# Patient Record
Sex: MALE | Race: WHITE | NOT HISPANIC OR LATINO | Employment: OTHER | URBAN - METROPOLITAN AREA
[De-identification: names, ages, dates, MRNs, and addresses within clinical notes are randomized per-mention and may not be internally consistent; named-entity substitution may affect disease eponyms.]

---

## 2024-04-03 ENCOUNTER — OFFICE VISIT (OUTPATIENT)
Dept: PAIN MEDICINE | Facility: CLINIC | Age: 74
End: 2024-04-03
Payer: COMMERCIAL

## 2024-04-03 VITALS
TEMPERATURE: 97.7 F | HEART RATE: 75 BPM | OXYGEN SATURATION: 96 % | WEIGHT: 155 LBS | HEIGHT: 69 IN | SYSTOLIC BLOOD PRESSURE: 108 MMHG | BODY MASS INDEX: 22.96 KG/M2 | RESPIRATION RATE: 18 BRPM | DIASTOLIC BLOOD PRESSURE: 77 MMHG

## 2024-04-03 DIAGNOSIS — R26.89 BALANCE PROBLEM: ICD-10-CM

## 2024-04-03 DIAGNOSIS — R29.2 HYPERREFLEXIA: Primary | ICD-10-CM

## 2024-04-03 DIAGNOSIS — M54.2 NECK PAIN: ICD-10-CM

## 2024-04-03 PROCEDURE — 1159F MED LIST DOCD IN RCRD: CPT | Performed by: ANESTHESIOLOGY

## 2024-04-03 PROCEDURE — 99204 OFFICE O/P NEW MOD 45 MIN: CPT | Performed by: ANESTHESIOLOGY

## 2024-04-03 PROCEDURE — 99214 OFFICE O/P EST MOD 30 MIN: CPT | Performed by: ANESTHESIOLOGY

## 2024-04-03 PROCEDURE — 1125F AMNT PAIN NOTED PAIN PRSNT: CPT | Performed by: ANESTHESIOLOGY

## 2024-04-03 PROCEDURE — 1160F RVW MEDS BY RX/DR IN RCRD: CPT | Performed by: ANESTHESIOLOGY

## 2024-04-03 RX ORDER — SUCRALFATE 1 G/1
1 TABLET ORAL
COMMUNITY

## 2024-04-03 RX ORDER — GABAPENTIN 600 MG/1
800 TABLET ORAL 3 TIMES DAILY
COMMUNITY
Start: 2023-08-28

## 2024-04-03 RX ORDER — PANTOPRAZOLE SODIUM 40 MG/1
40 TABLET, DELAYED RELEASE ORAL
COMMUNITY

## 2024-04-03 RX ORDER — OXYCODONE HYDROCHLORIDE 5 MG/1
5 TABLET ORAL EVERY 4 HOURS PRN
COMMUNITY

## 2024-04-03 RX ORDER — SENNOSIDES 8.6 MG
1 TABLET ORAL DAILY PRN
COMMUNITY
Start: 2024-03-01

## 2024-04-03 RX ORDER — ATORVASTATIN CALCIUM 80 MG/1
80 TABLET, FILM COATED ORAL NIGHTLY
COMMUNITY

## 2024-04-03 RX ORDER — DULOXETIN HYDROCHLORIDE 60 MG/1
120 CAPSULE, DELAYED RELEASE ORAL DAILY
COMMUNITY
Start: 2023-11-24

## 2024-04-03 RX ORDER — LOPERAMIDE HCL 2 MG
2 TABLET ORAL AS NEEDED
COMMUNITY

## 2024-04-03 RX ORDER — CYANOCOBALAMIN (VITAMIN B-12) 500 MCG
3 TABLET ORAL NIGHTLY PRN
COMMUNITY

## 2024-04-03 RX ORDER — BUPROPION HYDROCHLORIDE 150 MG/1
150 TABLET ORAL DAILY
COMMUNITY
Start: 2023-11-24

## 2024-04-03 RX ORDER — ACETAMINOPHEN 325 MG/1
650 TABLET ORAL EVERY 6 HOURS PRN
COMMUNITY

## 2024-04-03 RX ORDER — DOCUSATE SODIUM 100 MG/1
100 TABLET ORAL 2 TIMES DAILY PRN
COMMUNITY
Start: 2024-03-01

## 2024-04-03 RX ORDER — BISACODYL 5 MG
10 TABLET, DELAYED RELEASE (ENTERIC COATED) ORAL DAILY PRN
COMMUNITY

## 2024-04-03 RX ORDER — CYCLOBENZAPRINE HCL 5 MG
5 TABLET ORAL 3 TIMES DAILY PRN
COMMUNITY

## 2024-04-03 RX ORDER — METOPROLOL SUCCINATE 100 MG/1
100 TABLET, EXTENDED RELEASE ORAL DAILY
COMMUNITY
Start: 2023-11-24

## 2024-04-03 RX ORDER — DICLOFENAC SODIUM 10 MG/G
4 GEL TOPICAL 2 TIMES DAILY PRN
COMMUNITY

## 2024-04-03 RX ORDER — CHOLECALCIFEROL (VITAMIN D3) 50 MCG
50 TABLET ORAL DAILY
COMMUNITY
Start: 2024-02-24

## 2024-04-03 RX ORDER — LIDOCAINE 50 MG/G
1 PATCH TOPICAL DAILY
COMMUNITY

## 2024-04-03 RX ORDER — METHOCARBAMOL 750 MG/1
750 TABLET, FILM COATED ORAL EVERY 8 HOURS PRN
COMMUNITY

## 2024-04-03 RX ORDER — FLUTICASONE PROPIONATE 50 MCG
1 SPRAY, SUSPENSION (ML) NASAL DAILY
COMMUNITY
Start: 2023-11-24

## 2024-04-03 SDOH — ECONOMIC STABILITY: FOOD INSECURITY: WITHIN THE PAST 12 MONTHS, THE FOOD YOU BOUGHT JUST DIDN'T LAST AND YOU DIDN'T HAVE MONEY TO GET MORE.: NEVER TRUE

## 2024-04-03 SDOH — ECONOMIC STABILITY: FOOD INSECURITY: WITHIN THE PAST 12 MONTHS, YOU WORRIED THAT YOUR FOOD WOULD RUN OUT BEFORE YOU GOT MONEY TO BUY MORE.: NEVER TRUE

## 2024-04-03 ASSESSMENT — ENCOUNTER SYMPTOMS
SHORTNESS OF BREATH: 0
CHEST TIGHTNESS: 0
NUMBNESS: 1
WEAKNESS: 0
ADENOPATHY: 0
BACK PAIN: 1
OCCASIONAL FEELINGS OF UNSTEADINESS: 1
EYE PAIN: 0
LOSS OF SENSATION IN FEET: 1
ABDOMINAL PAIN: 0
NECK PAIN: 1
DEPRESSION: 1
FEVER: 0
DIFFICULTY URINATING: 0

## 2024-04-03 ASSESSMENT — COLUMBIA-SUICIDE SEVERITY RATING SCALE - C-SSRS
2. HAVE YOU ACTUALLY HAD ANY THOUGHTS OF KILLING YOURSELF?: NO
1. IN THE PAST MONTH, HAVE YOU WISHED YOU WERE DEAD OR WISHED YOU COULD GO TO SLEEP AND NOT WAKE UP?: NO
6. HAVE YOU EVER DONE ANYTHING, STARTED TO DO ANYTHING, OR PREPARED TO DO ANYTHING TO END YOUR LIFE?: NO

## 2024-04-03 ASSESSMENT — PAIN - FUNCTIONAL ASSESSMENT: PAIN_FUNCTIONAL_ASSESSMENT: 0-10

## 2024-04-03 ASSESSMENT — PATIENT HEALTH QUESTIONNAIRE - PHQ9
1. LITTLE INTEREST OR PLEASURE IN DOING THINGS: SEVERAL DAYS
2. FEELING DOWN, DEPRESSED OR HOPELESS: SEVERAL DAYS
SUM OF ALL RESPONSES TO PHQ9 QUESTIONS 1 AND 2: 2
10. IF YOU CHECKED OFF ANY PROBLEMS, HOW DIFFICULT HAVE THESE PROBLEMS MADE IT FOR YOU TO DO YOUR WORK, TAKE CARE OF THINGS AT HOME, OR GET ALONG WITH OTHER PEOPLE: VERY DIFFICULT

## 2024-04-03 ASSESSMENT — PAIN SCALES - GENERAL: PAINLEVEL: 8

## 2024-04-03 ASSESSMENT — PAIN DESCRIPTION - DESCRIPTORS: DESCRIPTORS: ACHING;BURNING

## 2024-04-03 ASSESSMENT — LIFESTYLE VARIABLES
HOW OFTEN DO YOU HAVE A DRINK CONTAINING ALCOHOL: NEVER
TOTAL SCORE: 0
AUDIT-C TOTAL SCORE: 0
SKIP TO QUESTIONS 9-10: 1
HOW OFTEN DO YOU HAVE SIX OR MORE DRINKS ON ONE OCCASION: NEVER
HOW MANY STANDARD DRINKS CONTAINING ALCOHOL DO YOU HAVE ON A TYPICAL DAY: PATIENT DOES NOT DRINK

## 2024-04-03 NOTE — PROGRESS NOTES
Chief Complain    New Patient Visit (For pain in  neck that radiates down to right shoulder, have been going on for several years. Deny neck and back surgery, had a block that lasted 1 day about 3- 4 years ago. Currently take oxycodone and use topical rubs with no relief. Was referred by NP. )    History Of Present Illness  Wilian Murry is a 73 y.o. male here for evaluation of neck pain, low back pain. The patient has been experiencing these symptoms for last 5year(s). The patient describes the pain as stabbing pain. The patient's current pain score is 6-7 on a scale from 0-10. The pain is worsened by  movement of his head  and is alleviated by medications opioid analgesics. Since the start of the symptoms the pain has been unchanged.    The patient denies any fever, chills, weight loss, bladder/ bowel incontinence, history of cancer, history of IV drug abuse.    Elbow down both arms are sleep X 1-2 years  Issues with balance  History of Marijuana use X 6 months 1-2 a week     Cyclobenzaprine  Tylenol  Cymbalta  Gabapentin  Methocarbamol  Oxycodone 5 mg every 4 hrs    Lives in [facility]      H/o perforated ulcer 4 months s/p Sx in [facility]?    Past Medical History  S/p pacemaker placement in November of 2004  Sick sinus syndrome  TIA  Anxiety      Surgical History  History of bowel surgery    Social History  He reports that he has been smoking cigarettes. He started smoking about 50 years ago. He has been smoking an average of .5 packs per day. He has never used smokeless tobacco. He reports that he does not drink alcohol and does not use drugs.    Family History  Mother- uterine cancer  Father - Brain Tumor     Allergies  Patient has no known allergies.    Review of Systems  Review of Systems   Constitutional:  Negative for fever.   HENT:  Negative for ear pain.    Eyes:  Negative for pain.   Respiratory:  Negative for chest tightness and shortness of breath.    Cardiovascular:  Positive for chest  "pain.   Gastrointestinal:  Negative for abdominal pain.   Endocrine: Negative for cold intolerance and heat intolerance.   Genitourinary:  Negative for difficulty urinating.   Musculoskeletal:  Positive for back pain and neck pain.   Skin:  Negative for rash.   Allergic/Immunologic: Negative for food allergies.   Neurological:  Positive for numbness. Negative for weakness.   Hematological:  Negative for adenopathy.   Psychiatric/Behavioral:  Negative for suicidal ideas.         Physical Exam  Physical Exam  HENT:      Head: Normocephalic and atraumatic.   Eyes:      Extraocular Movements: Extraocular movements intact.      Pupils: Pupils are equal, round, and reactive to light.   Cardiovascular:      Rate and Rhythm: Normal rate.   Pulmonary:      Effort: Pulmonary effort is normal.   Abdominal:      Palpations: Abdomen is soft.   Musculoskeletal:      Cervical back: Neck supple. Decreased range of motion.        Back:    Skin:     General: Skin is warm.   Neurological:      Mental Status: He is alert and oriented to person, place, and time.      Motor: Motor function is intact.      Coordination: Coordination abnormal.      Deep Tendon Reflexes:      Reflex Scores:       Tricep reflexes are 3+ on the right side and 3+ on the left side.       Bicep reflexes are 3+ on the right side and 3+ on the left side.       Brachioradialis reflexes are 3+ on the right side and 3+ on the left side.       Patellar reflexes are 3+ on the right side and 3+ on the left side.       Achilles reflexes are 3+ on the right side and 4+ on the left side.  Psychiatric:         Mood and Affect: Mood normal.         Behavior: Behavior normal.           Last Recorded Vitals  Blood pressure 108/77, pulse 75, temperature 36.5 °C (97.7 °F), resp. rate 18, height 1.753 m (5' 9\"), weight 70.3 kg (155 lb), SpO2 96 %.         Assessment/Plan   Encounter Diagnoses   Name Primary?    Hyperreflexia Yes    Balance problem     Neck pain         Wilian " Nolberto Murry is a 73 y.o. male here for evaluation of chronic neck, low back pain.  Worst of his pain is in the neck area.  He has been experiencing the symptoms for last 5 years or so has been gradually getting worse.  Report issues with his balance.  Currently he is residing in a nursing home for last 6 months..  He is on chronic opiate therapy for his chronic pain currently managing his pain with 5 mg of oxycodone which she is taking 3-4 times a day.  Which is helping his pain modestly.  Denies any side effects.  He does have past medical history significant of sick sinus syndrome, status post pacemaker placement history of intestinal perforation reportedly from NSAID use.  On physical examination today does have very brisk reflexes, I do suspect potential myelopathy.  I would recommend getting an x-ray and an CT scan for further evaluation.  Consider referral to neurosurgery after reviewing CT scan findings.  Would increase the oxycodone to 7.5 mg up to 4 times daily as needed.  Discussed risk associated with narcotics including but not limited to addiction, dependence, respiratory depression, death, mental health issues, hormonal changes.  I have personally reviewed the [facility] report. This report is scanned into the electronic medical record. I have considered the risks of abuse, dependence, addiction and diversion.  opiate agreement was signed by the patient.  Follow-up after completion of CT cervical spine.    Total time spent caring for the patient today was 45 minutes. This includes time spent before the visit reviewing the chart, time spent during the visit, and time spent after the visit on documentation.         Andriy Osei MD

## 2024-04-24 ENCOUNTER — NURSING HOME VISIT (OUTPATIENT)
Dept: POST ACUTE CARE | Facility: EXTERNAL LOCATION | Age: 74
End: 2024-04-24
Payer: COMMERCIAL

## 2024-04-24 DIAGNOSIS — M54.2 NECK PAIN: ICD-10-CM

## 2024-04-24 DIAGNOSIS — G45.9 TIA (TRANSIENT ISCHEMIC ATTACK): ICD-10-CM

## 2024-04-24 DIAGNOSIS — G54.2 CERVICAL NEUROPATHY: ICD-10-CM

## 2024-04-24 DIAGNOSIS — F41.9 ANXIETY: ICD-10-CM

## 2024-04-24 DIAGNOSIS — I10 PRIMARY HYPERTENSION: Primary | ICD-10-CM

## 2024-04-24 PROCEDURE — 99306 1ST NF CARE HIGH MDM 50: CPT | Performed by: STUDENT IN AN ORGANIZED HEALTH CARE EDUCATION/TRAINING PROGRAM

## 2024-04-25 PROBLEM — I10 PRIMARY HYPERTENSION: Status: ACTIVE | Noted: 2024-04-25

## 2024-04-25 PROBLEM — M54.2 NECK PAIN: Status: ACTIVE | Noted: 2024-04-25

## 2024-04-25 PROBLEM — F41.9 ANXIETY: Status: ACTIVE | Noted: 2024-04-25

## 2024-04-25 PROBLEM — G45.9 TIA (TRANSIENT ISCHEMIC ATTACK): Status: ACTIVE | Noted: 2024-04-25

## 2024-04-25 PROBLEM — G54.2 CERVICAL NEUROPATHY: Status: ACTIVE | Noted: 2024-04-25

## 2024-04-25 ASSESSMENT — ENCOUNTER SYMPTOMS
NECK PAIN: 1
GASTROINTESTINAL NEGATIVE: 1
CONSTITUTIONAL NEGATIVE: 1
BACK PAIN: 1
MYALGIAS: 1
NEUROLOGICAL NEGATIVE: 1
RESPIRATORY NEGATIVE: 1
PSYCHIATRIC NEGATIVE: 1
CARDIOVASCULAR NEGATIVE: 1
ARTHRALGIAS: 1

## 2024-04-26 NOTE — PROGRESS NOTES
Subjective   Patient ID: Wilian Murry is a 73 y.o. male.    Patient is a 73-year-old male who recently was at a previous facility.  Past medical history includes hypertension, depression, anxiety, TIA, hyperlipidemia, syncope who presents due to underlying cervical neuropathy.  Patient was admitted to previous facility for similar complaints of underlying cervical neuropathy.  Unfortunately unable to receive additional records.  On evaluation, patient reports that he has been having issues with his neck pain for quite some time.  States that it is unbearable.  He has not seen a neurosurgeon for this.  Does not have any numbness or tingling, however inhibits his daily life.  Otherwise, he reports that he is doing okay   But pain is unbearable.  States that he is working with therapy.  Denies any additional issues        Review of Systems   Constitutional: Negative.    HENT: Negative.     Respiratory: Negative.     Cardiovascular: Negative.    Gastrointestinal: Negative.    Musculoskeletal:  Positive for arthralgias, back pain, myalgias and neck pain.   Skin: Negative.    Neurological: Negative.         Dec ROM   Psychiatric/Behavioral: Negative.         Objective Vitals Reviewed via facility EMR   Physical Exam  Constitutional:       General: He is not in acute distress.     Appearance: He is not ill-appearing.   Eyes:      Pupils: Pupils are equal, round, and reactive to light.   Cardiovascular:      Rate and Rhythm: Normal rate and regular rhythm.      Pulses: Normal pulses.      Heart sounds: No murmur heard.  Pulmonary:      Effort: No respiratory distress.      Breath sounds: No wheezing.   Abdominal:      General: Abdomen is flat. Bowel sounds are normal. There is no distension.   Musculoskeletal:      Right lower leg: No edema.      Left lower leg: No edema.      Comments: Noted difficulty with ROM of cervial spine, pain reported with rotation   Skin:     General: Skin is warm and dry.   Neurological:       Mental Status: He is alert. Mental status is at baseline.      Cranial Nerves: No cranial nerve deficit.      Motor: No weakness.   Psychiatric:         Mood and Affect: Mood normal.         Behavior: Behavior normal.         Assessment/Plan   Diagnoses and all orders for this visit:  Primary hypertension  Anxiety  Neck pain  TIA (transient ischemic attack)  Cervical neuropathy    Patient seen and examined on routine evaluation.  Recently admitted to the facility.  Per history from previous documentation, patient can be drug-seeking at times.  Has follow-up with pain management, encouraged follow-up with regards to this.  Continue PT OT.  Advised weekly labs while on skilled therapy.  Will follow-up on titrating pain medication until seen by pain management.  Try to wean down narcotics as tolerated.  Likely needs underlying cervical MRI due to chronic pain however will defer to pain management specialist. Updated [facility] with care plan    Reviewed and approved by JOHN BECK on 4/25/24 at 11:10 PM.

## 2024-05-01 ENCOUNTER — NURSING HOME VISIT (OUTPATIENT)
Dept: POST ACUTE CARE | Facility: EXTERNAL LOCATION | Age: 74
End: 2024-05-01
Payer: COMMERCIAL

## 2024-05-01 DIAGNOSIS — Z76.5 DRUG-SEEKING BEHAVIOR: ICD-10-CM

## 2024-05-01 DIAGNOSIS — G45.9 TIA (TRANSIENT ISCHEMIC ATTACK): ICD-10-CM

## 2024-05-01 DIAGNOSIS — G54.2 CERVICAL NEUROPATHY: Primary | ICD-10-CM

## 2024-05-01 DIAGNOSIS — M54.2 NECK PAIN: ICD-10-CM

## 2024-05-01 DIAGNOSIS — I10 PRIMARY HYPERTENSION: ICD-10-CM

## 2024-05-01 PROCEDURE — 99309 SBSQ NF CARE MODERATE MDM 30: CPT | Performed by: STUDENT IN AN ORGANIZED HEALTH CARE EDUCATION/TRAINING PROGRAM

## 2024-05-01 NOTE — LETTER
Patient: Wilina Murry  : 1950    Encounter Date: 2024    Subjective  Patient ID: Wilian Murry is a 73 y.o. male.    Patient seen and examined at bedside.  Regards that he is overall doing well, however still having some cervical pain.  States that this is a chronic issue for him, but is frustrated with the management of this.  Recently saw pain management, did changes medications to 7.5 mg every 6 hours.  He regards that this did not make any improvement of his pain management, and would like to go back to his original dosing of 5 mg every 4 hours.  He did inquire about increasing 7.5 mg every 4 hours however discussed risks with regards to this as he is likely becoming addicted to the medication and would not recommend further titration of dosing.  Otherwise, states he is overall doing well with therapy.  Denies any additional issues.        Review of Systems   Constitutional: Negative.    HENT: Negative.     Respiratory: Negative.     Cardiovascular: Negative.    Gastrointestinal: Negative.    Musculoskeletal:  Positive for arthralgias, back pain, neck pain and neck stiffness.   Skin: Negative.    Neurological: Negative.    Psychiatric/Behavioral: Negative.         ObjectiveVitals Reviewed via facility EMR   Physical Exam  Constitutional:       General: He is not in acute distress.     Appearance: He is not ill-appearing.      Comments: Ambualting well with therapy   Eyes:      Pupils: Pupils are equal, round, and reactive to light.   Cardiovascular:      Rate and Rhythm: Normal rate and regular rhythm.      Pulses: Normal pulses.      Heart sounds: No murmur heard.  Pulmonary:      Effort: No respiratory distress.      Breath sounds: No wheezing.   Abdominal:      General: Abdomen is flat. Bowel sounds are normal. There is no distension.   Musculoskeletal:      Right lower leg: No edema.      Left lower leg: No edema.   Skin:     General: Skin is warm and dry.   Neurological:      Mental  Status: He is alert. Mental status is at baseline.      Cranial Nerves: No cranial nerve deficit.      Motor: Weakness present.   Psychiatric:         Mood and Affect: Mood normal.         Behavior: Behavior normal.         Assessment/Plan  Diagnoses and all orders for this visit:  Cervical neuropathy  TIA (transient ischemic attack)  Neck pain  Primary hypertension  Drug-seeking behavior        As per HPI, patient seen and evaluated.  Did inquire about increasing opioid medication dosing, however highly deferred this.  Recommend close follow-up with pain management.  We will change patient to 5 mg every 4 hours, in addition had Flexeril as needed as well as lidocaine patch.  Discussed with patient that we will continue to try to titrate down his pain medication.  Advised continuation with physical therapy.  Due to significant degeneration, as well as patient following with pain management.  Advised neurosurgery evaluation.  Continue supportive care.  Discussed with staff who is agreeable with plan.    Reviewed and approved by JASE BECK on 5/4/24 at 8:39 PM.       Electronically Signed By: Jase Beck DO   5/4/24  8:39 PM

## 2024-05-04 PROBLEM — Z76.5 DRUG-SEEKING BEHAVIOR: Status: ACTIVE | Noted: 2024-05-04

## 2024-05-04 ASSESSMENT — ENCOUNTER SYMPTOMS
BACK PAIN: 1
PSYCHIATRIC NEGATIVE: 1
NEUROLOGICAL NEGATIVE: 1
RESPIRATORY NEGATIVE: 1
NECK STIFFNESS: 1
NECK PAIN: 1
CARDIOVASCULAR NEGATIVE: 1
CONSTITUTIONAL NEGATIVE: 1
ARTHRALGIAS: 1
GASTROINTESTINAL NEGATIVE: 1

## 2024-05-05 NOTE — PROGRESS NOTES
Subjective   Patient ID: Wilian Murry is a 73 y.o. male.    Patient seen and examined at bedside.  Regards that he is overall doing well, however still having some cervical pain.  States that this is a chronic issue for him, but is frustrated with the management of this.  Recently saw pain management, did changes medications to 7.5 mg every 6 hours.  He regards that this did not make any improvement of his pain management, and would like to go back to his original dosing of 5 mg every 4 hours.  He did inquire about increasing 7.5 mg every 4 hours however discussed risks with regards to this as he is likely becoming addicted to the medication and would not recommend further titration of dosing.  Otherwise, states he is overall doing well with therapy.  Denies any additional issues.        Review of Systems   Constitutional: Negative.    HENT: Negative.     Respiratory: Negative.     Cardiovascular: Negative.    Gastrointestinal: Negative.    Musculoskeletal:  Positive for arthralgias, back pain, neck pain and neck stiffness.   Skin: Negative.    Neurological: Negative.    Psychiatric/Behavioral: Negative.         Objective Vitals Reviewed via facility EMR   Physical Exam  Constitutional:       General: He is not in acute distress.     Appearance: He is not ill-appearing.      Comments: Ambualting well with therapy   Eyes:      Pupils: Pupils are equal, round, and reactive to light.   Cardiovascular:      Rate and Rhythm: Normal rate and regular rhythm.      Pulses: Normal pulses.      Heart sounds: No murmur heard.  Pulmonary:      Effort: No respiratory distress.      Breath sounds: No wheezing.   Abdominal:      General: Abdomen is flat. Bowel sounds are normal. There is no distension.   Musculoskeletal:      Right lower leg: No edema.      Left lower leg: No edema.   Skin:     General: Skin is warm and dry.   Neurological:      Mental Status: He is alert. Mental status is at baseline.      Cranial Nerves:  No cranial nerve deficit.      Motor: Weakness present.   Psychiatric:         Mood and Affect: Mood normal.         Behavior: Behavior normal.         Assessment/Plan   Diagnoses and all orders for this visit:  Cervical neuropathy  TIA (transient ischemic attack)  Neck pain  Primary hypertension  Drug-seeking behavior        As per HPI, patient seen and evaluated.  Did inquire about increasing opioid medication dosing, however highly deferred this.  Recommend close follow-up with pain management.  We will change patient to 5 mg every 4 hours, in addition had Flexeril as needed as well as lidocaine patch.  Discussed with patient that we will continue to try to titrate down his pain medication.  Advised continuation with physical therapy.  Due to significant degeneration, as well as patient following with pain management.  Advised neurosurgery evaluation.  Continue supportive care.  Discussed with staff who is agreeable with plan.    Reviewed and approved by JOHN BECK on 5/4/24 at 8:39 PM.

## 2024-05-09 ENCOUNTER — NURSING HOME VISIT (OUTPATIENT)
Dept: POST ACUTE CARE | Facility: EXTERNAL LOCATION | Age: 74
End: 2024-05-09
Payer: COMMERCIAL

## 2024-05-09 DIAGNOSIS — Z76.5 DRUG-SEEKING BEHAVIOR: ICD-10-CM

## 2024-05-09 DIAGNOSIS — G54.2 CERVICAL NEUROPATHY: Primary | ICD-10-CM

## 2024-05-09 DIAGNOSIS — F41.9 ANXIETY: ICD-10-CM

## 2024-05-09 PROCEDURE — 99308 SBSQ NF CARE LOW MDM 20: CPT | Performed by: STUDENT IN AN ORGANIZED HEALTH CARE EDUCATION/TRAINING PROGRAM

## 2024-05-09 ASSESSMENT — ENCOUNTER SYMPTOMS
PSYCHIATRIC NEGATIVE: 1
CARDIOVASCULAR NEGATIVE: 1
RESPIRATORY NEGATIVE: 1
CONSTITUTIONAL NEGATIVE: 1
ARTHRALGIAS: 1
GASTROINTESTINAL NEGATIVE: 1
NEUROLOGICAL NEGATIVE: 1
BACK PAIN: 1

## 2024-05-10 NOTE — PROGRESS NOTES
Subjective   Patient ID: Wilian Murry is a 73 y.o. male.    Patient seen and evaluated at bedside.  Regards that he is doing overall well, intermittently, does have issues with his underlying neck.  States that he does have a pain management appointment upcoming next week.  Regards that his current pain regimen is not completely controlling his pain, however staff has been noticing that patient does seem manipulative with regards to narcotic medications.  He does endorse some numbness and tingling though.  Otherwise, regards no additional issues or concerns.          Review of Systems   Constitutional: Negative.    HENT: Negative.     Respiratory: Negative.     Cardiovascular: Negative.    Gastrointestinal: Negative.    Musculoskeletal:  Positive for arthralgias and back pain.   Skin: Negative.    Neurological: Negative.    Psychiatric/Behavioral: Negative.         Objective Vitals Reviewed via facility EMR   Physical Exam  Constitutional:       General: He is not in acute distress.     Appearance: He is not ill-appearing.   Eyes:      Pupils: Pupils are equal, round, and reactive to light.   Cardiovascular:      Rate and Rhythm: Normal rate and regular rhythm.      Pulses: Normal pulses.      Heart sounds: No murmur heard.  Pulmonary:      Effort: No respiratory distress.      Breath sounds: No wheezing.   Abdominal:      General: Abdomen is flat. Bowel sounds are normal. There is no distension.   Musculoskeletal:      Right lower leg: No edema.      Left lower leg: No edema.      Comments: ROM present, pain out of proportion of exam   Skin:     General: Skin is warm and dry.   Neurological:      Mental Status: He is alert. Mental status is at baseline.      Cranial Nerves: No cranial nerve deficit.      Motor: No weakness.   Psychiatric:         Mood and Affect: Mood normal.         Behavior: Behavior normal.      Comments: Drug seeking, fixated         Assessment/Plan   Diagnoses and all orders for this  visit:  Cervical neuropathy  Drug-seeking behavior  Anxiety    Patient seen and examined.  I appreciate pain  medicine recommendations.  Will need multimodal approach with regards to patient's pain control as suspect they will be difficult to wean off narcotics.  Recommend Medrol Dosepak.  Potentially could benefit from cervical injection.  Continue supportive care, PT OT.  Will hold off on changing pain medications until seen by appropriate specialist, as abrupt change could cause worsening side effects or poor outcomes.  Continue supportive care, staff updated with care plan.    Reviewed and approved by JOHN BECK on 5/9/24 at 11:19 PM.

## 2024-05-20 ENCOUNTER — HOSPITAL ENCOUNTER (EMERGENCY)
Facility: HOSPITAL | Age: 74
Discharge: HOME | End: 2024-05-20
Attending: EMERGENCY MEDICINE
Payer: COMMERCIAL

## 2024-05-20 ENCOUNTER — APPOINTMENT (OUTPATIENT)
Dept: CARDIOLOGY | Facility: HOSPITAL | Age: 74
End: 2024-05-20
Payer: COMMERCIAL

## 2024-05-20 VITALS
SYSTOLIC BLOOD PRESSURE: 97 MMHG | HEART RATE: 62 BPM | OXYGEN SATURATION: 96 % | DIASTOLIC BLOOD PRESSURE: 70 MMHG | RESPIRATION RATE: 16 BRPM | TEMPERATURE: 98 F | BODY MASS INDEX: 25.12 KG/M2 | WEIGHT: 160.05 LBS | HEIGHT: 67 IN

## 2024-05-20 DIAGNOSIS — T14.91XA SUICIDAL BEHAVIOR WITH ATTEMPTED SELF-INJURY (MULTI): Primary | ICD-10-CM

## 2024-05-20 LAB
ALBUMIN SERPL BCP-MCNC: 3.8 G/DL (ref 3.4–5)
ALP SERPL-CCNC: 67 U/L (ref 33–136)
ALT SERPL W P-5'-P-CCNC: 19 U/L (ref 10–52)
AMPHETAMINES UR QL SCN: ABNORMAL
ANION GAP SERPL CALC-SCNC: 10 MMOL/L (ref 10–20)
APAP SERPL-MCNC: <10 UG/ML
APPEARANCE UR: CLEAR
AST SERPL W P-5'-P-CCNC: 15 U/L (ref 9–39)
BARBITURATES UR QL SCN: ABNORMAL
BASOPHILS # BLD AUTO: 0.04 X10*3/UL (ref 0–0.1)
BASOPHILS NFR BLD AUTO: 0.5 %
BENZODIAZ UR QL SCN: ABNORMAL
BILIRUB SERPL-MCNC: 0.4 MG/DL (ref 0–1.2)
BILIRUB UR STRIP.AUTO-MCNC: NEGATIVE MG/DL
BUN SERPL-MCNC: 20 MG/DL (ref 6–23)
BZE UR QL SCN: ABNORMAL
CALCIUM SERPL-MCNC: 9 MG/DL (ref 8.6–10.3)
CANNABINOIDS UR QL SCN: ABNORMAL
CHLORIDE SERPL-SCNC: 108 MMOL/L (ref 98–107)
CO2 SERPL-SCNC: 23 MMOL/L (ref 21–32)
COLOR UR: NORMAL
CREAT SERPL-MCNC: 0.95 MG/DL (ref 0.5–1.3)
EGFRCR SERPLBLD CKD-EPI 2021: 86 ML/MIN/1.73M*2
EOSINOPHIL # BLD AUTO: 0.61 X10*3/UL (ref 0–0.4)
EOSINOPHIL NFR BLD AUTO: 7 %
ERYTHROCYTE [DISTWIDTH] IN BLOOD BY AUTOMATED COUNT: 17.2 % (ref 11.5–14.5)
ETHANOL SERPL-MCNC: <10 MG/DL
FENTANYL+NORFENTANYL UR QL SCN: ABNORMAL
GLUCOSE SERPL-MCNC: 73 MG/DL (ref 74–99)
GLUCOSE UR STRIP.AUTO-MCNC: NORMAL MG/DL
HCT VFR BLD AUTO: 41.2 % (ref 41–52)
HGB BLD-MCNC: 12.7 G/DL (ref 13.5–17.5)
HOLD SPECIMEN: NORMAL
HOLD SPECIMEN: NORMAL
IMM GRANULOCYTES # BLD AUTO: 0.03 X10*3/UL (ref 0–0.5)
IMM GRANULOCYTES NFR BLD AUTO: 0.3 % (ref 0–0.9)
KETONES UR STRIP.AUTO-MCNC: NEGATIVE MG/DL
LEUKOCYTE ESTERASE UR QL STRIP.AUTO: NEGATIVE
LYMPHOCYTES # BLD AUTO: 2.28 X10*3/UL (ref 0.8–3)
LYMPHOCYTES NFR BLD AUTO: 26.3 %
MCH RBC QN AUTO: 28.7 PG (ref 26–34)
MCHC RBC AUTO-ENTMCNC: 30.8 G/DL (ref 32–36)
MCV RBC AUTO: 93 FL (ref 80–100)
METHADONE UR QL SCN: ABNORMAL
MONOCYTES # BLD AUTO: 1.36 X10*3/UL (ref 0.05–0.8)
MONOCYTES NFR BLD AUTO: 15.7 %
NEUTROPHILS # BLD AUTO: 4.36 X10*3/UL (ref 1.6–5.5)
NEUTROPHILS NFR BLD AUTO: 50.2 %
NITRITE UR QL STRIP.AUTO: NEGATIVE
NRBC BLD-RTO: 0 /100 WBCS (ref 0–0)
OPIATES UR QL SCN: ABNORMAL
OXYCODONE+OXYMORPHONE UR QL SCN: ABNORMAL
PCP UR QL SCN: ABNORMAL
PH UR STRIP.AUTO: 6 [PH]
PLATELET # BLD AUTO: 146 X10*3/UL (ref 150–450)
POTASSIUM SERPL-SCNC: 4.3 MMOL/L (ref 3.5–5.3)
PROT SERPL-MCNC: 6.1 G/DL (ref 6.4–8.2)
PROT UR STRIP.AUTO-MCNC: NEGATIVE MG/DL
RBC # BLD AUTO: 4.42 X10*6/UL (ref 4.5–5.9)
RBC # UR STRIP.AUTO: NEGATIVE /UL
SALICYLATES SERPL-MCNC: <3 MG/DL
SODIUM SERPL-SCNC: 137 MMOL/L (ref 136–145)
SP GR UR STRIP.AUTO: 1.01
UROBILINOGEN UR STRIP.AUTO-MCNC: NORMAL MG/DL
WBC # BLD AUTO: 8.7 X10*3/UL (ref 4.4–11.3)

## 2024-05-20 PROCEDURE — 96374 THER/PROPH/DIAG INJ IV PUSH: CPT

## 2024-05-20 PROCEDURE — 2500000004 HC RX 250 GENERAL PHARMACY W/ HCPCS (ALT 636 FOR OP/ED)

## 2024-05-20 PROCEDURE — 99205 OFFICE O/P NEW HI 60 MIN: CPT

## 2024-05-20 PROCEDURE — 2500000004 HC RX 250 GENERAL PHARMACY W/ HCPCS (ALT 636 FOR OP/ED): Performed by: STUDENT IN AN ORGANIZED HEALTH CARE EDUCATION/TRAINING PROGRAM

## 2024-05-20 PROCEDURE — 81003 URINALYSIS AUTO W/O SCOPE: CPT | Mod: 59 | Performed by: STUDENT IN AN ORGANIZED HEALTH CARE EDUCATION/TRAINING PROGRAM

## 2024-05-20 PROCEDURE — 99284 EMERGENCY DEPT VISIT MOD MDM: CPT | Mod: 25

## 2024-05-20 PROCEDURE — 93005 ELECTROCARDIOGRAM TRACING: CPT

## 2024-05-20 PROCEDURE — 84075 ASSAY ALKALINE PHOSPHATASE: CPT | Performed by: STUDENT IN AN ORGANIZED HEALTH CARE EDUCATION/TRAINING PROGRAM

## 2024-05-20 PROCEDURE — 93005 ELECTROCARDIOGRAM TRACING: CPT | Mod: 59

## 2024-05-20 PROCEDURE — 80143 DRUG ASSAY ACETAMINOPHEN: CPT | Performed by: STUDENT IN AN ORGANIZED HEALTH CARE EDUCATION/TRAINING PROGRAM

## 2024-05-20 PROCEDURE — 85025 COMPLETE CBC W/AUTO DIFF WBC: CPT | Performed by: STUDENT IN AN ORGANIZED HEALTH CARE EDUCATION/TRAINING PROGRAM

## 2024-05-20 PROCEDURE — 96375 TX/PRO/DX INJ NEW DRUG ADDON: CPT

## 2024-05-20 PROCEDURE — 80307 DRUG TEST PRSMV CHEM ANLYZR: CPT | Performed by: STUDENT IN AN ORGANIZED HEALTH CARE EDUCATION/TRAINING PROGRAM

## 2024-05-20 PROCEDURE — 99284 EMERGENCY DEPT VISIT MOD MDM: CPT | Performed by: EMERGENCY MEDICINE

## 2024-05-20 PROCEDURE — 2500000001 HC RX 250 WO HCPCS SELF ADMINISTERED DRUGS (ALT 637 FOR MEDICARE OP): Performed by: STUDENT IN AN ORGANIZED HEALTH CARE EDUCATION/TRAINING PROGRAM

## 2024-05-20 PROCEDURE — 36415 COLL VENOUS BLD VENIPUNCTURE: CPT | Performed by: STUDENT IN AN ORGANIZED HEALTH CARE EDUCATION/TRAINING PROGRAM

## 2024-05-20 RX ORDER — DIPHENHYDRAMINE HYDROCHLORIDE 50 MG/ML
25 INJECTION INTRAMUSCULAR; INTRAVENOUS ONCE
Status: COMPLETED | OUTPATIENT
Start: 2024-05-20 | End: 2024-05-20

## 2024-05-20 RX ORDER — MORPHINE SULFATE 4 MG/ML
4 INJECTION, SOLUTION INTRAMUSCULAR; INTRAVENOUS ONCE
Status: COMPLETED | OUTPATIENT
Start: 2024-05-20 | End: 2024-05-20

## 2024-05-20 RX ORDER — DIPHENHYDRAMINE HYDROCHLORIDE 50 MG/ML
INJECTION INTRAMUSCULAR; INTRAVENOUS
Status: COMPLETED
Start: 2024-05-20 | End: 2024-05-20

## 2024-05-20 RX ORDER — OXYCODONE HYDROCHLORIDE 5 MG/1
5 TABLET ORAL ONCE
Status: COMPLETED | OUTPATIENT
Start: 2024-05-20 | End: 2024-05-20

## 2024-05-20 RX ADMIN — OXYCODONE HYDROCHLORIDE 5 MG: 5 TABLET ORAL at 11:23

## 2024-05-20 RX ADMIN — DIPHENHYDRAMINE HYDROCHLORIDE 25 MG: 50 INJECTION, SOLUTION INTRAMUSCULAR; INTRAVENOUS at 05:48

## 2024-05-20 RX ADMIN — DIPHENHYDRAMINE HYDROCHLORIDE 25 MG: 50 INJECTION INTRAMUSCULAR; INTRAVENOUS at 05:48

## 2024-05-20 RX ADMIN — MORPHINE SULFATE 4 MG: 4 INJECTION, SOLUTION INTRAMUSCULAR; INTRAVENOUS at 05:30

## 2024-05-20 SDOH — SOCIAL STABILITY: SOCIAL NETWORK: EMOTIONAL SUPPORT GIVEN: REASSURE

## 2024-05-20 SDOH — HEALTH STABILITY: MENTAL HEALTH: HAVE YOU WISHED YOU WERE DEAD OR WISHED YOU COULD GO TO SLEEP AND NOT WAKE UP?: NO

## 2024-05-20 SDOH — HEALTH STABILITY: MENTAL HEALTH: CONTENT: UNREMARKABLE

## 2024-05-20 SDOH — HEALTH STABILITY: MENTAL HEALTH: HAVE YOU ACTUALLY HAD ANY THOUGHTS OF KILLING YOURSELF?: NO

## 2024-05-20 SDOH — HEALTH STABILITY: MENTAL HEALTH: SUICIDE ASSESSMENT: ADULT (C-SSRS)

## 2024-05-20 SDOH — HEALTH STABILITY: MENTAL HEALTH: BEHAVIORS/MOOD: ANXIOUS;CRYING

## 2024-05-20 SDOH — HEALTH STABILITY: MENTAL HEALTH: NEEDS EXPRESSED: EMOTIONAL

## 2024-05-20 SDOH — HEALTH STABILITY: MENTAL HEALTH: HAVE YOU EVER DONE ANYTHING, STARTED TO DO ANYTHING, OR PREPARED TO DO ANYTHING TO END YOUR LIFE?: NO

## 2024-05-20 SDOH — HEALTH STABILITY: MENTAL HEALTH: BEHAVIORS/MOOD: APPROPRIATE FOR SITUATION

## 2024-05-20 SDOH — HEALTH STABILITY: MENTAL HEALTH: SLEEP PATTERN: DIFFICULTY FALLING ASLEEP

## 2024-05-20 SDOH — HEALTH STABILITY: MENTAL HEALTH: NEEDS EXPRESSED: EMOTIONAL;PHYSICAL

## 2024-05-20 SDOH — HEALTH STABILITY: MENTAL HEALTH: BEHAVIORS/MOOD: APPROPRIATE FOR SITUATION;CALM;COOPERATIVE

## 2024-05-20 ASSESSMENT — PAIN SCALES - GENERAL
PAINLEVEL_OUTOF10: 10 - WORST POSSIBLE PAIN
PAINLEVEL_OUTOF10: 3
PAINLEVEL_OUTOF10: 6
PAINLEVEL_OUTOF10: 8

## 2024-05-20 ASSESSMENT — COLUMBIA-SUICIDE SEVERITY RATING SCALE - C-SSRS
1. IN THE PAST MONTH, HAVE YOU WISHED YOU WERE DEAD OR WISHED YOU COULD GO TO SLEEP AND NOT WAKE UP?: NO
2. HAVE YOU ACTUALLY HAD ANY THOUGHTS OF KILLING YOURSELF?: NO
1. SINCE LAST CONTACT, HAVE YOU WISHED YOU WERE DEAD OR WISHED YOU COULD GO TO SLEEP AND NOT WAKE UP?: NO
6. HAVE YOU EVER DONE ANYTHING, STARTED TO DO ANYTHING, OR PREPARED TO DO ANYTHING TO END YOUR LIFE?: NO
2. HAVE YOU ACTUALLY HAD ANY THOUGHTS OF KILLING YOURSELF?: NO
6. HAVE YOU EVER DONE ANYTHING, STARTED TO DO ANYTHING, OR PREPARED TO DO ANYTHING TO END YOUR LIFE?: NO

## 2024-05-20 ASSESSMENT — LIFESTYLE VARIABLES
HAVE YOU EVER FELT YOU SHOULD CUT DOWN ON YOUR DRINKING: NO
EVER FELT BAD OR GUILTY ABOUT YOUR DRINKING: NO
HAVE PEOPLE ANNOYED YOU BY CRITICIZING YOUR DRINKING: NO
EVER HAD A DRINK FIRST THING IN THE MORNING TO STEADY YOUR NERVES TO GET RID OF A HANGOVER: NO
TOTAL SCORE: 0

## 2024-05-20 ASSESSMENT — PAIN - FUNCTIONAL ASSESSMENT
PAIN_FUNCTIONAL_ASSESSMENT: 0-10

## 2024-05-20 ASSESSMENT — PAIN DESCRIPTION - DESCRIPTORS: DESCRIPTORS: BURNING;SHARP

## 2024-05-20 ASSESSMENT — PAIN DESCRIPTION - LOCATION
LOCATION: NECK

## 2024-05-20 ASSESSMENT — PAIN DESCRIPTION - PROGRESSION: CLINICAL_PROGRESSION: NOT CHANGED

## 2024-05-20 ASSESSMENT — PAIN DESCRIPTION - PAIN TYPE: TYPE: CHRONIC PAIN

## 2024-05-20 NOTE — CONSULTS
"Consults     HISTORY OF PRESENT ILLNESS:  Wilian Murry is a 73 y.o. 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 Per ED provider notes, he had been pressing his call button at his nursing home for pain medications and when no one responded to his call he held up a broke CD to try to cut himself and expressed SI.     On interview, the patient is visibly in pain but states that he did not actually try to cut himself with a broken CD. This was only something he said he would do out of frustration and pain. He denies that this was a premeditated plan and just said this in the moment. He says that he \"crossed a line\" and that he was not thinking rationally because he was in so much pain. He states that he does not want to die. He just wants to get his pain under control. He denies current SI or AVH. When he was at the nursing home he felt that the nursing staff was not adequately addressing his pain. He said that his nurse is never available when he calls to request pain medication. He had been getting oxycodone 5mg q4hr but this was recently changed to 7.5mg q6hr which is not working for him. 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 He is also frustrated with the care he has been getting at the Pain Management clinic. He wants to get spinal fusion surgery but this keeps getting delayed. He has an appointment with neurology in October.    Per collateral from his nursing home, [facility] ([phone]), the patient did not actually do anything to harm himself and had only said that he would cut himself with a broken CD. The nurse was on her lunch break when he pressed his call button. When his nurse did not respond he then called 911. He received oxycodone PRN and there are reports of him at the " nursing home being pain seeking. There is no history of the patient harming himself before at this nursing home.     Per chart review, the patient is on multiple pain medications (in addition to oxycodone) including cyclobenzaprine, Cymbalta, gabapentin, methocarbamol. He is also on Wellbutrin 150mg daily. There is no documented psych history in the patient's chart.       PSYCHIATRIC REVIEW OF SYSTEMS  Depression: depressed mood  Anxiety: restlessness or feeling keyed up or on edge and irritability  Penelope: negative  Psychosis: negative  Delirium: negative   Trauma: negative    PSYCHIATRIC HISTORY  Prior diagnoses: anxiety  Prior hospitalizations: none, per chart review  History of suicide attempts: none, per chart review  History of self-harm: none, per chart review  History of trauma/abuse/loss: none, per chart review  History of violence: none, per chart review    Current psychiatrist: n/a  Current mental health agency: n/a    Current psychiatric medications: Cymbalta (for pain?), Wellbutrin 150mg daily (for smoking cessation?)  Past psychiatric medications: unknown  Past psychiatric treatments: unknown    Family psychiatric history: unknown    SUBSTANCE USE HISTORY   He reports that he has been smoking cigarettes. He started smoking about 50 years ago. He has a 25.2 pack-year smoking history. He has never used smokeless tobacco. He reports that he does not drink alcohol and does not use drugs.    SOCIAL HISTORY  Social History     Socioeconomic History    Marital status:      Spouse name: Not on file    Number of children: Not on file    Years of education: Not on file    Highest education level: Not on file   Occupational History    Not on file   Tobacco Use    Smoking status: Every Day     Current packs/day: 0.50     Average packs/day: 0.5 packs/day for 50.4 years (25.2 ttl pk-yrs)     Types: Cigarettes     Start date: 1974    Smokeless tobacco: Never   Substance and Sexual Activity    Alcohol use: Never  "   Drug use: Never    Sexual activity: Not on file   Other Topics Concern    Not on file   Social History Narrative    Not on file     Social Determinants of Health     Financial Resource Strain: Not on file   Food Insecurity: No Food Insecurity (4/3/2024)    Hunger Vital Sign     Worried About Running Out of Food in the Last Year: Never true     Ran Out of Food in the Last Year: Never true   Transportation Needs: Not on file   Physical Activity: Not on file   Stress: Not on file   Social Connections: Not on file   Intimate Partner Violence: Not on file   Housing Stability: Not on file      Current living situation: lives at [facility] since April 2024. Prior to this, he had been living at [facility].   Current employment/source of income: retired  Current stressors: chronic neck pain    Born and raised: unknown  Family: has 2 sisters who live in [place]  Childhood: unknown  Education: unknown  History of learning difficulty: unknown  Employment: retired nurse  Marital status:    Children: unknown  Social support: patient states he has no family or friends who provide support   Evangelical/Spirituality: unknown  Legal history: unknown   history: unknown  Access to weapons: denies-lives in nursing home     PAST MEDICAL HISTORY  No past medical history on file.     Additional Past Medical History: HTN, TIA, sick sinus syndrome, pacemaker placement   Prior Head trauma/TBI/LOC/seizure history: unknown    PAST SURGICAL HISTORY  No past surgical history on file.     FAMILY HISTORY  No family history on file.     ALLERGIES  Patient has no known allergies.    OBJECTIVE    VITALS      4/3/2024     9:51 AM 5/20/2024     4:30 AM 5/20/2024     6:45 AM   Vitals   Systolic 108 159 106   Diastolic 77 86 85   Heart Rate 75 76 75   Temp 36.5 °C (97.7 °F)     Resp 18 16 14   Height (in) 1.753 m (5' 9\") 1.702 m (5' 7\") 1.702 m (5' 7\")   Weight (lb) 155 160 160.05   BMI 22.89 kg/m2 25.06 kg/m2 25.07 " "kg/m2   BSA (m2) 1.85 m2 1.85 m2 1.85 m2   Visit Report Report          MENTAL STATUS EXAM  General/Appearance: Moderate Distress, appears stated age, in hospital gown, body habitus: average, grooming/hygiene is reasonable for setting, holding left side of neck in pain  Attitude/Behavior: engages in interview, appropriate eye contact  Motor Activity: no psychomotor agitation/retardation, gait: not assessed  Mood: \"I'm in pain\"  Affect: Quality-mood congruent, Intensity-normal, Range-full  Speech: normal rate/tone/volume/prosody/syntax, rambling, interruptible  Thought Process: linear  Thought Content: denies SI/HI, Delusional thinking: none elicited  Thought Perception: denies AVH  Cognition: alert & oriented x 4, no deficits in attention/concentration noted, good fund of knowledge, recent and remote memory intact  Insight: fair  Judgment: limited       MEDICAL REVIEW OF SYSTEMS  Review of Systems     HOME MEDICATIONS  Medication Documentation Review Audit       Reviewed by Tamika Martinez RN (Registered Nurse) on 04/03/24 at 1327      Medication Order Taking? Sig Documenting Provider Last Dose Status   acetaminophen (Tylenol) 325 mg tablet [id number]  Take 2 tablets (650 mg) by mouth every 6 hours if needed for fever (temp greater than 38.0 C) (pain or fever). Historical Provider, MD  Active   atorvastatin (Lipitor) 80 mg tablet [id number]  Take 1 tablet (80 mg) by mouth once daily at bedtime. Historical Provider, MD  Active   bisacodyl (Dulcolax) 5 mg EC tablet [id number]  Take 2 tablets (10 mg) by mouth once daily as needed for constipation. Do not crush, chew, or split. Historical Provider, MD  Active   buPROPion XL (Wellbutrin XL) 150 mg 24 hr tablet [id number]  Take 1 tablet (150 mg) by mouth once daily. Historical Provider, MD  Active   cholecalciferol (Vitamin D-3) 50 MCG (2000 UT) tablet [id number]  Take 1 tablet (50 mcg) by mouth once daily. Historical Provider, MD  Active   cyclobenzaprine (Flexeril) 5 mg " tablet [id number]  Take 1 tablet (5 mg) by mouth 3 times a day as needed for muscle spasms. Historical Provider, MD  Active   diclofenac sodium (Voltaren) 1 % gel [id number]  Apply 4.5 inches (4 g) topically 2 times a day as needed. To neck Miller Pena MD  Active   DULoxetine (Cymbalta) 60 mg DR capsule [id number]  Take 2 capsules (120 mg) by mouth once daily. Miller Provider, MD  Active   fluticasone (Flonase) 50 mcg/actuation nasal spray [id number]  Administer 1 spray into each nostril once daily. Historical Provider, MD  Active   gabapentin (Neurontin) 600 mg tablet [id number]  Take 800 mg by mouth 3 times a day. Historical Provider, MD  Active   lidocaine (Lidoderm) 5 % patch [id number]  Place 1 patch on the skin once daily. Remove & discard patch within 12 hours or as directed by MD. Miller Pena MD  Active   loperamide (Imodium A-D) 2 mg tablet [id number]  Take 1 tablet (2 mg) by mouth if needed for diarrhea. Historical MD Becky  Active   melatonin 1 mg tablet [id number]  Take 3 tablets (3 mg) by mouth as needed at bedtime for sleep. Historical Provider, MD  Active   methocarbamol (Robaxin) 750 mg tablet [id number]  Take 1 tablet (750 mg) by mouth every 8 hours if needed for muscle spasms. Historical MD Becky  Active   metoprolol succinate XL (Toprol-XL) 100 mg 24 hr tablet [id number]  Take 1 tablet (100 mg) by mouth once daily. Historical Provider, MD  Active   NON FORMULARY [id number]  Biofreeze 4% apply to left shoulder and neck topically two times a day prn pain Historical Provider, MD  Active   oxyCODONE (Roxicodone) 5 mg immediate release tablet [id number]  Take 1 tablet (5 mg) by mouth every 4 hours if needed (pain). Miller Pena MD  Active   pantoprazole (ProtoNix) 40 mg EC tablet [id number]  Take 1 tablet (40 mg) by mouth once daily in the morning. Take before meals. Do not crush, chew, or split. Miller Pena MD  Active   senna 8.6 mg tablet [id number]  Take 1 tablet  (8.6 mg) by mouth once daily as needed for constipation. Historical Provider, MD  Active   sodium phosphates (Fleet, Pediatric,) 9.5-3.5 gram/59 mL enema [id number]  Insert into the rectum 1 time. Historical Provider, MD  Active   Stool Softener 100 mg tablet [id number]  Take 1 tablet (100 mg) by mouth 2 times a day as needed for constipation. Historical Provider, MD  Active   sucralfate (Carafate) 1 gram tablet [id number]  Take 1 tablet (1 g) by mouth 4 times a day before meals. Historical Provider, MD  Active                     CURRENT MEDICATIONS  Scheduled medications: benadryl 25mg IV once @0548, morphine 4mg IV once @0530    Continuous medications: none    PRN medications: none       LABS  Results for orders placed or performed during the hospital encounter of 05/20/24 (from the past 24 hour(s))   CBC and Auto Differential   Result Value Ref Range    WBC 8.7 4.4 - 11.3 x10*3/uL    nRBC 0.0 0.0 - 0.0 /100 WBCs    RBC 4.42 (L) 4.50 - 5.90 x10*6/uL    Hemoglobin 12.7 (L) 13.5 - 17.5 g/dL    Hematocrit 41.2 41.0 - 52.0 %    MCV 93 80 - 100 fL    MCH 28.7 26.0 - 34.0 pg    MCHC 30.8 (L) 32.0 - 36.0 g/dL    RDW 17.2 (H) 11.5 - 14.5 %    Platelets 146 (L) 150 - 450 x10*3/uL    Neutrophils % 50.2 40.0 - 80.0 %    Immature Granulocytes %, Automated 0.3 0.0 - 0.9 %    Lymphocytes % 26.3 13.0 - 44.0 %    Monocytes % 15.7 2.0 - 10.0 %    Eosinophils % 7.0 0.0 - 6.0 %    Basophils % 0.5 0.0 - 2.0 %    Neutrophils Absolute 4.36 1.60 - 5.50 x10*3/uL    Immature Granulocytes Absolute, Automated 0.03 0.00 - 0.50 x10*3/uL    Lymphocytes Absolute 2.28 0.80 - 3.00 x10*3/uL    Monocytes Absolute 1.36 (H) 0.05 - 0.80 x10*3/uL    Eosinophils Absolute 0.61 (H) 0.00 - 0.40 x10*3/uL    Basophils Absolute 0.04 0.00 - 0.10 x10*3/uL   Lavender Top   Result Value Ref Range    Extra Tube Hold for add-ons.    Comprehensive metabolic panel   Result Value Ref Range    Glucose 73 (L) 74 - 99 mg/dL    Sodium 137 136 - 145 mmol/L    Potassium 4.3  3.5 - 5.3 mmol/L    Chloride 108 (H) 98 - 107 mmol/L    Bicarbonate 23 21 - 32 mmol/L    Anion Gap 10 10 - 20 mmol/L    Urea Nitrogen 20 6 - 23 mg/dL    Creatinine 0.95 0.50 - 1.30 mg/dL    eGFR 86 >60 mL/min/1.73m*2    Calcium 9.0 8.6 - 10.3 mg/dL    Albumin 3.8 3.4 - 5.0 g/dL    Alkaline Phosphatase 67 33 - 136 U/L    Total Protein 6.1 (L) 6.4 - 8.2 g/dL    AST 15 9 - 39 U/L    Bilirubin, Total 0.4 0.0 - 1.2 mg/dL    ALT 19 10 - 52 U/L   Acute Toxicology Panel, Blood   Result Value Ref Range    Acetaminophen <10.0 10.0 - 30.0 ug/mL    Salicylate  <3 4 - 20 mg/dL    Alcohol <10 <=10 mg/dL        IMAGING  No results found.     PSYCHIATRIC RISK ASSESSMENT  Violence Risk Factors:  male  Acute Risk of Harm to Others is Considered: Low  Suicide Risk Factors: male, ; /[place] native, age > 65 years old , and chronic pain  Protective Factors: social support/connectedness  Acute Risk of Harm to Self is Considered: Low    ASSESSMENT AND PLAN  Mr. Murry is a 72 y/o male with anxiety and chronic pain who was brought into the ED from his nursing home after expressing suicidal ideations of cutting himself with a broken CD. Both the patient and nursing home collateral deny that he actually attempted to cut himself. He denies any real intent behind his SI. On interview, the patient says he was being irrational because he was in so much pain. He does not meet criteria for inpatient psychiatric admission. His suicidal ideations are in the setting of uncontrolled pain and were not premeditated. He does not have any other risk factors and has no documented psychiatric history. His main life stressor is his chronic neck pain which he feels is not being adequately addressed at his nursing home.    Recommend scheduling oxycodone 5mg q4hr while in the ED as the patient's pain is not adequately controlled with PRNs. Will defer to ED provider regarding possible neurology consult while in the ED as the patient's  next outpatient appointment is in October 2024.     IMPRESSION  #Chronic pain  #suicidal ideation without intent    RECOMMENDATIONS  Safety:  - Patient does not currently meet criteria for inpatient psychiatric admission.   - Patient does not require a 1:1 sitter from a psychiatric perspective at this time.    Medications:  -scheduled oxycodone 5mg q4hr    Work-up:   - defer to ED provider regarding neurology consult      Discussed recommendations with primary team.    Patient was seen and staffed/discussed with Dr. Ramos, who agrees with above plan.    Bharti Lancaster MD     Patient seen personally with resident.   Diagnosis: adjustment disorder, unspecified                    Chronic pain                     Suicidal statements    Tatianna Ramos MD  Psychiatry, [facility]

## 2024-05-20 NOTE — PROGRESS NOTES

## 2024-05-20 NOTE — ED PROVIDER NOTES
HPI   Chief Complaint   Patient presents with   • Suicidal     Pt was requesting his pain medication at his nursing facility. The staff was not responding to his call light fast enough and he began to experience SI/HI Pt currently denies Ideation and per facility was found breaking a CD to cut his wrist with.       This is a 73 years old male patient presented to the emergency department after he verbalized suicidal thoughts and was holding a CD trying to cut himself to kill himself.  Stated that he was in pain at the nursing home and he did not want to give him pain medication.  Denies homicidal ideation, visual, or auditory hallucination.  Denies any physical complaint during my encounter such as headache, lightheadedness, dizziness, chest pain, shortness of breath, abdominal pain, weakness, or focal numbness.    Review of system: As stated above in the HPI section.                          Manny Coma Scale Score: 15                     Patient History   No past medical history on file.  No past surgical history on file.  No family history on file.  Social History     Tobacco Use   • Smoking status: Every Day     Current packs/day: 0.50     Average packs/day: 0.5 packs/day for 50.4 years (25.2 ttl pk-yrs)     Types: Cigarettes     Start date: 1974   • Smokeless tobacco: Never   Substance Use Topics   • Alcohol use: Never   • Drug use: Never       Physical Exam   ED Triage Vitals [05/20/24 0430]   Temp Heart Rate Respirations BP   -- 76 16 159/86      Pulse Ox Temp src Heart Rate Source Patient Position   100 % -- -- --      BP Location FiO2 (%)     Right arm --       Physical Exam  Vitals and nursing note reviewed.   Constitutional:       General: He is not in acute distress.     Appearance: He is well-developed.   HENT:      Head: Normocephalic and atraumatic.   Eyes:      Conjunctiva/sclera: Conjunctivae normal.   Cardiovascular:      Rate and Rhythm: Normal rate and regular rhythm.      Heart sounds: No  murmur heard.  Pulmonary:      Effort: Pulmonary effort is normal. No respiratory distress.      Breath sounds: Normal breath sounds.   Abdominal:      Palpations: Abdomen is soft.      Tenderness: There is no abdominal tenderness.   Musculoskeletal:         General: No swelling.      Cervical back: Neck supple.   Skin:     General: Skin is warm and dry.      Capillary Refill: Capillary refill takes less than 2 seconds.   Neurological:      Mental Status: He is alert.   Psychiatric:         Mood and Affect: Mood normal.         ED Course & MDM   Diagnoses as of 05/20/24 0654   Suicidal behavior with attempted self-injury (Multi)       Medical Decision Making  Patient seen and examined, will obtain basic labs, psych workup, and once medically cleared he will be evaluated by EPAT for placement.  Will sign out the patient care at this point to the morning staff pending medical clearance and EPAT evaluation.        Procedure  Procedures     Orville Casey DO  05/20/24 0655

## 2024-05-27 LAB
ATRIAL RATE: 75 BPM
ATRIAL RATE: 75 BPM
P AXIS: 27 DEGREES
P AXIS: 5 DEGREES
P OFFSET: 204 MS
P OFFSET: 205 MS
P ONSET: 166 MS
P ONSET: 175 MS
PR INTERVAL: 112 MS
PR INTERVAL: 92 MS
Q ONSET: 221 MS
Q ONSET: 222 MS
QRS COUNT: 12 BEATS
QRS COUNT: 12 BEATS
QRS DURATION: 108 MS
QRS DURATION: 98 MS
QT INTERVAL: 390 MS
QT INTERVAL: 394 MS
QTC CALCULATION(BAZETT): 435 MS
QTC CALCULATION(BAZETT): 439 MS
QTC FREDERICIA: 419 MS
QTC FREDERICIA: 424 MS
R AXIS: 65 DEGREES
R AXIS: 71 DEGREES
T AXIS: 47 DEGREES
T AXIS: 49 DEGREES
T OFFSET: 417 MS
T OFFSET: 418 MS
VENTRICULAR RATE: 75 BPM
VENTRICULAR RATE: 75 BPM

## 2024-06-11 ENCOUNTER — APPOINTMENT (OUTPATIENT)
Dept: NEUROLOGY | Facility: CLINIC | Age: 74
End: 2024-06-11
Payer: COMMERCIAL

## 2024-06-12 ENCOUNTER — NURSING HOME VISIT (OUTPATIENT)
Dept: POST ACUTE CARE | Facility: EXTERNAL LOCATION | Age: 74
End: 2024-06-12
Payer: COMMERCIAL

## 2024-06-12 DIAGNOSIS — G45.9 TIA (TRANSIENT ISCHEMIC ATTACK): ICD-10-CM

## 2024-06-12 DIAGNOSIS — Z76.5 DRUG-SEEKING BEHAVIOR: ICD-10-CM

## 2024-06-12 DIAGNOSIS — M54.2 NECK PAIN: ICD-10-CM

## 2024-06-12 DIAGNOSIS — F41.9 ANXIETY: ICD-10-CM

## 2024-06-12 DIAGNOSIS — I10 PRIMARY HYPERTENSION: Primary | ICD-10-CM

## 2024-06-15 ASSESSMENT — ENCOUNTER SYMPTOMS
ARTHRALGIAS: 1
RESPIRATORY NEGATIVE: 1
GASTROINTESTINAL NEGATIVE: 1
PSYCHIATRIC NEGATIVE: 1
CONSTITUTIONAL NEGATIVE: 1
NEUROLOGICAL NEGATIVE: 1
CARDIOVASCULAR NEGATIVE: 1
BACK PAIN: 1

## 2024-06-15 NOTE — PROGRESS NOTES
Subjective   Patient ID: Wilian Murry is a 73 y.o. male.    Patient seen and examined at bedside.  Regards that he is doing overall well, however having persistent issues with neck pain.  This is ongoing for him, states pain medications that are helpful, however still having some issues.  did see pain management but did not find it beneficial.  Does have a follow-up with neurosurgery for potential neurosurgical intervention.  Otherwise states no additional issues or concerns.        Review of Systems   Constitutional: Negative.    HENT: Negative.     Respiratory: Negative.     Cardiovascular: Negative.    Gastrointestinal: Negative.    Musculoskeletal:  Positive for arthralgias and back pain.   Skin: Negative.    Neurological: Negative.    Psychiatric/Behavioral: Negative.         Objective Vitals Reviewed via facility EMR   Physical Exam  Constitutional:       General: He is not in acute distress.     Appearance: He is not ill-appearing.   Eyes:      Pupils: Pupils are equal, round, and reactive to light.   Cardiovascular:      Rate and Rhythm: Normal rate and regular rhythm.      Pulses: Normal pulses.      Heart sounds: No murmur heard.  Pulmonary:      Effort: No respiratory distress.      Breath sounds: No wheezing.   Abdominal:      General: Abdomen is flat. Bowel sounds are normal. There is no distension.   Musculoskeletal:      Right lower leg: No edema.      Left lower leg: No edema.      Comments: Neck pain   Skin:     General: Skin is warm and dry.   Neurological:      Mental Status: He is alert. Mental status is at baseline.      Cranial Nerves: No cranial nerve deficit.      Motor: Weakness present.   Psychiatric:         Mood and Affect: Mood normal.         Behavior: Behavior normal.      Comments: Fixated on pain         Assessment/Plan   Diagnoses and all orders for this visit:  Primary hypertension  Drug-seeking behavior  Anxiety  Neck pain  TIA (transient ischemic attack)      Patient seen  and examined at bedside.  Overall medically stable, still having issues with his upper cervical spine.  Appreciate pain management recommendations.  Discussed with patient weaning down on opioids at this time however defers.  Appreciate neurosurgery recommendations and psych recommendations and this was reviewed.  Due to persistent pain patient ultimately may require surgical intervention for this.  Otherwise continue supportive care optimize PT OT.  No other changes in medications.    Reviewed and approved by JOHN BECK on 6/15/24 at 2:38 PM.

## 2024-06-26 ENCOUNTER — APPOINTMENT (OUTPATIENT)
Dept: CARDIOLOGY | Facility: HOSPITAL | Age: 74
End: 2024-06-26
Payer: COMMERCIAL

## 2024-06-26 ENCOUNTER — APPOINTMENT (OUTPATIENT)
Dept: RADIOLOGY | Facility: HOSPITAL | Age: 74
End: 2024-06-26
Payer: COMMERCIAL

## 2024-06-26 ENCOUNTER — HOSPITAL ENCOUNTER (OUTPATIENT)
Facility: HOSPITAL | Age: 74
Setting detail: OBSERVATION
Discharge: HOME | End: 2024-06-26
Attending: STUDENT IN AN ORGANIZED HEALTH CARE EDUCATION/TRAINING PROGRAM | Admitting: INTERNAL MEDICINE
Payer: COMMERCIAL

## 2024-06-26 ENCOUNTER — DOCUMENTATION (OUTPATIENT)
Dept: PRIMARY CARE | Facility: CLINIC | Age: 74
End: 2024-06-26

## 2024-06-26 ENCOUNTER — TELEPHONE (OUTPATIENT)
Dept: CARDIOLOGY | Facility: HOSPITAL | Age: 74
End: 2024-06-26

## 2024-06-26 VITALS
WEIGHT: 155 LBS | OXYGEN SATURATION: 97 % | HEART RATE: 74 BPM | DIASTOLIC BLOOD PRESSURE: 96 MMHG | TEMPERATURE: 97.3 F | SYSTOLIC BLOOD PRESSURE: 146 MMHG | RESPIRATION RATE: 20 BRPM | HEIGHT: 67 IN | BODY MASS INDEX: 24.33 KG/M2

## 2024-06-26 DIAGNOSIS — R07.9 CHEST PAIN: Primary | ICD-10-CM

## 2024-06-26 DIAGNOSIS — R07.9 CHEST PAIN, UNSPECIFIED TYPE: ICD-10-CM

## 2024-06-26 DIAGNOSIS — R07.89 OTHER CHEST PAIN: ICD-10-CM

## 2024-06-26 DIAGNOSIS — I34.0 NONRHEUMATIC MITRAL VALVE REGURGITATION: Primary | ICD-10-CM

## 2024-06-26 DIAGNOSIS — I10 PRIMARY HYPERTENSION: ICD-10-CM

## 2024-06-26 LAB
ALBUMIN SERPL BCP-MCNC: 4 G/DL (ref 3.4–5)
ALP SERPL-CCNC: 68 U/L (ref 33–136)
ALT SERPL W P-5'-P-CCNC: 18 U/L (ref 10–52)
ANION GAP SERPL CALC-SCNC: 10 MMOL/L (ref 10–20)
AORTIC VALVE PEAK VELOCITY: 1.14 M/S
AST SERPL W P-5'-P-CCNC: 17 U/L (ref 9–39)
AV PEAK GRADIENT: 5.2 MMHG
AVA (PEAK VEL): 3.03 CM2
BASOPHILS # BLD AUTO: 0.05 X10*3/UL (ref 0–0.1)
BASOPHILS NFR BLD AUTO: 0.5 %
BILIRUB SERPL-MCNC: 0.3 MG/DL (ref 0–1.2)
BNP SERPL-MCNC: 80 PG/ML (ref 0–99)
BUN SERPL-MCNC: 22 MG/DL (ref 6–23)
CALCIUM SERPL-MCNC: 9.4 MG/DL (ref 8.6–10.3)
CARDIAC TROPONIN I PNL SERPL HS: 4 NG/L (ref 0–20)
CARDIAC TROPONIN I PNL SERPL HS: 5 NG/L (ref 0–20)
CARDIAC TROPONIN I PNL SERPL HS: 5 NG/L (ref 0–20)
CHLORIDE SERPL-SCNC: 106 MMOL/L (ref 98–107)
CO2 SERPL-SCNC: 27 MMOL/L (ref 21–32)
CREAT SERPL-MCNC: 1.21 MG/DL (ref 0.5–1.3)
EGFRCR SERPLBLD CKD-EPI 2021: 64 ML/MIN/1.73M*2
EJECTION FRACTION APICAL 4 CHAMBER: 62.5
EJECTION FRACTION: 60 %
EOSINOPHIL # BLD AUTO: 0.5 X10*3/UL (ref 0–0.4)
EOSINOPHIL NFR BLD AUTO: 5.2 %
ERYTHROCYTE [DISTWIDTH] IN BLOOD BY AUTOMATED COUNT: 16.9 % (ref 11.5–14.5)
GLUCOSE SERPL-MCNC: 83 MG/DL (ref 74–99)
HCT VFR BLD AUTO: 35.9 % (ref 41–52)
HGB BLD-MCNC: 11.4 G/DL (ref 13.5–17.5)
IMM GRANULOCYTES # BLD AUTO: 0.02 X10*3/UL (ref 0–0.5)
IMM GRANULOCYTES NFR BLD AUTO: 0.2 % (ref 0–0.9)
INR PPP: 1 (ref 0.9–1.1)
LEFT VENTRICLE INTERNAL DIMENSION DIASTOLE: 5.3 CM (ref 3.5–6)
LEFT VENTRICULAR OUTFLOW TRACT DIAMETER: 2.4 CM
LV EJECTION FRACTION BIPLANE: 60 %
LYMPHOCYTES # BLD AUTO: 3.38 X10*3/UL (ref 0.8–3)
LYMPHOCYTES NFR BLD AUTO: 35.1 %
MAGNESIUM SERPL-MCNC: 2.11 MG/DL (ref 1.6–2.4)
MCH RBC QN AUTO: 28.9 PG (ref 26–34)
MCHC RBC AUTO-ENTMCNC: 31.8 G/DL (ref 32–36)
MCV RBC AUTO: 91 FL (ref 80–100)
MITRAL VALVE E/A RATIO: 1.2
MONOCYTES # BLD AUTO: 1.32 X10*3/UL (ref 0.05–0.8)
MONOCYTES NFR BLD AUTO: 13.7 %
NEUTROPHILS # BLD AUTO: 4.37 X10*3/UL (ref 1.6–5.5)
NEUTROPHILS NFR BLD AUTO: 45.3 %
NRBC BLD-RTO: 0 /100 WBCS (ref 0–0)
PLATELET # BLD AUTO: 185 X10*3/UL (ref 150–450)
POTASSIUM SERPL-SCNC: 4.6 MMOL/L (ref 3.5–5.3)
PROT SERPL-MCNC: 6.5 G/DL (ref 6.4–8.2)
PROTHROMBIN TIME: 11.6 SECONDS (ref 9.8–12.8)
RBC # BLD AUTO: 3.94 X10*6/UL (ref 4.5–5.9)
RIGHT VENTRICLE FREE WALL PEAK S': 11.3 CM/S
RIGHT VENTRICLE PEAK SYSTOLIC PRESSURE: 31.3 MMHG
SODIUM SERPL-SCNC: 138 MMOL/L (ref 136–145)
WBC # BLD AUTO: 9.6 X10*3/UL (ref 4.4–11.3)

## 2024-06-26 PROCEDURE — 93016 CV STRESS TEST SUPVJ ONLY: CPT | Performed by: INTERNAL MEDICINE

## 2024-06-26 PROCEDURE — 71045 X-RAY EXAM CHEST 1 VIEW: CPT | Performed by: STUDENT IN AN ORGANIZED HEALTH CARE EDUCATION/TRAINING PROGRAM

## 2024-06-26 PROCEDURE — 84484 ASSAY OF TROPONIN QUANT: CPT | Performed by: STUDENT IN AN ORGANIZED HEALTH CARE EDUCATION/TRAINING PROGRAM

## 2024-06-26 PROCEDURE — 93306 TTE W/DOPPLER COMPLETE: CPT

## 2024-06-26 PROCEDURE — 2500000001 HC RX 250 WO HCPCS SELF ADMINISTERED DRUGS (ALT 637 FOR MEDICARE OP): Performed by: INTERNAL MEDICINE

## 2024-06-26 PROCEDURE — 3430000001 HC RX 343 DIAGNOSTIC RADIOPHARMACEUTICALS: Performed by: INTERNAL MEDICINE

## 2024-06-26 PROCEDURE — 99223 1ST HOSP IP/OBS HIGH 75: CPT | Performed by: INTERNAL MEDICINE

## 2024-06-26 PROCEDURE — 99238 HOSP IP/OBS DSCHRG MGMT 30/<: CPT | Performed by: INTERNAL MEDICINE

## 2024-06-26 PROCEDURE — 99221 1ST HOSP IP/OBS SF/LOW 40: CPT | Performed by: NURSE PRACTITIONER

## 2024-06-26 PROCEDURE — 93306 TTE W/DOPPLER COMPLETE: CPT | Performed by: INTERNAL MEDICINE

## 2024-06-26 PROCEDURE — 36415 COLL VENOUS BLD VENIPUNCTURE: CPT | Performed by: STUDENT IN AN ORGANIZED HEALTH CARE EDUCATION/TRAINING PROGRAM

## 2024-06-26 PROCEDURE — 80053 COMPREHEN METABOLIC PANEL: CPT | Performed by: STUDENT IN AN ORGANIZED HEALTH CARE EDUCATION/TRAINING PROGRAM

## 2024-06-26 PROCEDURE — 78452 HT MUSCLE IMAGE SPECT MULT: CPT | Performed by: RADIOLOGY

## 2024-06-26 PROCEDURE — 93018 CV STRESS TEST I&R ONLY: CPT | Performed by: INTERNAL MEDICINE

## 2024-06-26 PROCEDURE — 99285 EMERGENCY DEPT VISIT HI MDM: CPT | Mod: 25

## 2024-06-26 PROCEDURE — 93010 ELECTROCARDIOGRAM REPORT: CPT | Performed by: INTERNAL MEDICINE

## 2024-06-26 PROCEDURE — 93005 ELECTROCARDIOGRAM TRACING: CPT

## 2024-06-26 PROCEDURE — 78452 HT MUSCLE IMAGE SPECT MULT: CPT

## 2024-06-26 PROCEDURE — 93017 CV STRESS TEST TRACING ONLY: CPT

## 2024-06-26 PROCEDURE — 85610 PROTHROMBIN TIME: CPT | Performed by: STUDENT IN AN ORGANIZED HEALTH CARE EDUCATION/TRAINING PROGRAM

## 2024-06-26 PROCEDURE — 99285 EMERGENCY DEPT VISIT HI MDM: CPT | Performed by: STUDENT IN AN ORGANIZED HEALTH CARE EDUCATION/TRAINING PROGRAM

## 2024-06-26 PROCEDURE — 83880 ASSAY OF NATRIURETIC PEPTIDE: CPT | Performed by: STUDENT IN AN ORGANIZED HEALTH CARE EDUCATION/TRAINING PROGRAM

## 2024-06-26 PROCEDURE — 2500000002 HC RX 250 W HCPCS SELF ADMINISTERED DRUGS (ALT 637 FOR MEDICARE OP, ALT 636 FOR OP/ED): Performed by: INTERNAL MEDICINE

## 2024-06-26 PROCEDURE — G0378 HOSPITAL OBSERVATION PER HR: HCPCS

## 2024-06-26 PROCEDURE — 93010 ELECTROCARDIOGRAM REPORT: CPT | Performed by: STUDENT IN AN ORGANIZED HEALTH CARE EDUCATION/TRAINING PROGRAM

## 2024-06-26 PROCEDURE — 84484 ASSAY OF TROPONIN QUANT: CPT | Mod: 91 | Performed by: STUDENT IN AN ORGANIZED HEALTH CARE EDUCATION/TRAINING PROGRAM

## 2024-06-26 PROCEDURE — 83735 ASSAY OF MAGNESIUM: CPT | Performed by: STUDENT IN AN ORGANIZED HEALTH CARE EDUCATION/TRAINING PROGRAM

## 2024-06-26 PROCEDURE — 71045 X-RAY EXAM CHEST 1 VIEW: CPT

## 2024-06-26 PROCEDURE — 85025 COMPLETE CBC W/AUTO DIFF WBC: CPT | Performed by: STUDENT IN AN ORGANIZED HEALTH CARE EDUCATION/TRAINING PROGRAM

## 2024-06-26 PROCEDURE — 84484 ASSAY OF TROPONIN QUANT: CPT | Mod: 91 | Performed by: NURSE PRACTITIONER

## 2024-06-26 PROCEDURE — 2500000005 HC RX 250 GENERAL PHARMACY W/O HCPCS: Performed by: INTERNAL MEDICINE

## 2024-06-26 PROCEDURE — A9502 TC99M TETROFOSMIN: HCPCS | Performed by: INTERNAL MEDICINE

## 2024-06-26 RX ORDER — PANTOPRAZOLE SODIUM 40 MG/1
40 TABLET, DELAYED RELEASE ORAL
Status: DISCONTINUED | OUTPATIENT
Start: 2024-06-26 | End: 2024-06-27 | Stop reason: HOSPADM

## 2024-06-26 RX ORDER — SENNOSIDES 8.6 MG/1
1 TABLET ORAL NIGHTLY
Status: DISCONTINUED | OUTPATIENT
Start: 2024-06-26 | End: 2024-06-27 | Stop reason: HOSPADM

## 2024-06-26 RX ORDER — ACETAMINOPHEN 160 MG/5ML
650 SOLUTION ORAL EVERY 4 HOURS PRN
Status: DISCONTINUED | OUTPATIENT
Start: 2024-06-26 | End: 2024-06-26

## 2024-06-26 RX ORDER — GUAIFENESIN 100 MG/5ML
200 SOLUTION ORAL EVERY 4 HOURS PRN
COMMUNITY

## 2024-06-26 RX ORDER — LIDOCAINE 560 MG/1
1 PATCH PERCUTANEOUS; TOPICAL; TRANSDERMAL DAILY
Status: DISCONTINUED | OUTPATIENT
Start: 2024-06-26 | End: 2024-06-27 | Stop reason: HOSPADM

## 2024-06-26 RX ORDER — GUAIFENESIN 100 MG/5ML
200 SOLUTION ORAL EVERY 4 HOURS PRN
Status: ON HOLD | COMMUNITY
End: 2024-06-26 | Stop reason: ENTERED-IN-ERROR

## 2024-06-26 RX ORDER — ACETAMINOPHEN 650 MG/1
650 SUPPOSITORY RECTAL EVERY 4 HOURS PRN
Status: DISCONTINUED | OUTPATIENT
Start: 2024-06-26 | End: 2024-06-26

## 2024-06-26 RX ORDER — LIDOCAINE 560 MG/1
1 PATCH PERCUTANEOUS; TOPICAL; TRANSDERMAL DAILY
Start: 2024-06-26

## 2024-06-26 RX ORDER — BISACODYL 10 MG/1
10 SUPPOSITORY RECTAL DAILY PRN
COMMUNITY

## 2024-06-26 RX ORDER — VENLAFAXINE 25 MG/1
12.5 TABLET ORAL DAILY
COMMUNITY
Start: 2024-06-25

## 2024-06-26 RX ORDER — GABAPENTIN 400 MG/1
800 CAPSULE ORAL 3 TIMES DAILY
Status: DISCONTINUED | OUTPATIENT
Start: 2024-06-26 | End: 2024-06-27 | Stop reason: HOSPADM

## 2024-06-26 RX ORDER — METHOCARBAMOL 750 MG/1
750 TABLET, FILM COATED ORAL EVERY 8 HOURS PRN
Status: DISCONTINUED | OUTPATIENT
Start: 2024-06-26 | End: 2024-06-27 | Stop reason: HOSPADM

## 2024-06-26 RX ORDER — ONDANSETRON HYDROCHLORIDE 2 MG/ML
4 INJECTION, SOLUTION INTRAVENOUS EVERY 8 HOURS PRN
Status: DISCONTINUED | OUTPATIENT
Start: 2024-06-26 | End: 2024-06-26

## 2024-06-26 RX ORDER — OXYCODONE HYDROCHLORIDE 5 MG/1
5 TABLET ORAL EVERY 4 HOURS PRN
Status: DISCONTINUED | OUTPATIENT
Start: 2024-06-26 | End: 2024-06-27 | Stop reason: HOSPADM

## 2024-06-26 RX ORDER — VENLAFAXINE 25 MG/1
12.5 TABLET ORAL DAILY
Status: DISCONTINUED | OUTPATIENT
Start: 2024-06-26 | End: 2024-06-27 | Stop reason: HOSPADM

## 2024-06-26 RX ORDER — SUCRALFATE 1 G/1
1 TABLET ORAL
Status: DISCONTINUED | OUTPATIENT
Start: 2024-06-26 | End: 2024-06-27 | Stop reason: HOSPADM

## 2024-06-26 RX ORDER — ACETAMINOPHEN 325 MG/1
650 TABLET ORAL EVERY 4 HOURS PRN
Status: DISCONTINUED | OUTPATIENT
Start: 2024-06-26 | End: 2024-06-26

## 2024-06-26 RX ORDER — ALUMINUM HYDROXIDE, MAGNESIUM HYDROXIDE, AND SIMETHICONE 2400; 240; 2400 MG/30ML; MG/30ML; MG/30ML
30 SUSPENSION ORAL EVERY 12 HOURS PRN
COMMUNITY

## 2024-06-26 RX ORDER — PSYLLIUM HUSK 0.4 G
5 CAPSULE ORAL EVERY 12 HOURS PRN
COMMUNITY

## 2024-06-26 RX ORDER — POLYETHYLENE GLYCOL 3350 17 G/17G
17 POWDER, FOR SOLUTION ORAL DAILY PRN
Status: DISCONTINUED | OUTPATIENT
Start: 2024-06-26 | End: 2024-06-27 | Stop reason: HOSPADM

## 2024-06-26 RX ORDER — DOCUSATE SODIUM 100 MG/1
100 CAPSULE, LIQUID FILLED ORAL EVERY 12 HOURS PRN
COMMUNITY

## 2024-06-26 RX ORDER — NAPROXEN SODIUM 220 MG/1
324 TABLET, FILM COATED ORAL ONCE
Status: DISCONTINUED | OUTPATIENT
Start: 2024-06-26 | End: 2024-06-26

## 2024-06-26 RX ORDER — ATORVASTATIN CALCIUM 80 MG/1
80 TABLET, FILM COATED ORAL NIGHTLY
Status: DISCONTINUED | OUTPATIENT
Start: 2024-06-26 | End: 2024-06-27 | Stop reason: HOSPADM

## 2024-06-26 RX ORDER — TALC
3 POWDER (GRAM) TOPICAL NIGHTLY PRN
Status: DISCONTINUED | OUTPATIENT
Start: 2024-06-26 | End: 2024-06-26

## 2024-06-26 RX ORDER — NITROGLYCERIN 0.4 MG/1
0.4 TABLET SUBLINGUAL EVERY 5 MIN PRN
Status: DISCONTINUED | OUTPATIENT
Start: 2024-06-26 | End: 2024-06-27 | Stop reason: HOSPADM

## 2024-06-26 RX ORDER — PANTOPRAZOLE SODIUM 40 MG/10ML
40 INJECTION, POWDER, LYOPHILIZED, FOR SOLUTION INTRAVENOUS
Status: DISCONTINUED | OUTPATIENT
Start: 2024-06-26 | End: 2024-06-27 | Stop reason: HOSPADM

## 2024-06-26 RX ORDER — ONDANSETRON 4 MG/1
4 TABLET, ORALLY DISINTEGRATING ORAL EVERY 8 HOURS PRN
Status: DISCONTINUED | OUTPATIENT
Start: 2024-06-26 | End: 2024-06-27 | Stop reason: HOSPADM

## 2024-06-26 RX ORDER — BUPROPION HYDROCHLORIDE 75 MG/1
75 TABLET ORAL DAILY
Status: DISCONTINUED | OUTPATIENT
Start: 2024-06-26 | End: 2024-06-27 | Stop reason: HOSPADM

## 2024-06-26 RX ORDER — ADHESIVE BANDAGE
30 BANDAGE TOPICAL DAILY PRN
COMMUNITY

## 2024-06-26 RX ORDER — DEXTROMETHORPHAN HYDROBROMIDE, GUAIFENESIN 5; 100 MG/5ML; MG/5ML
650 LIQUID ORAL EVERY 6 HOURS PRN
COMMUNITY

## 2024-06-26 RX ORDER — METOCLOPRAMIDE HYDROCHLORIDE 5 MG/ML
10 INJECTION INTRAMUSCULAR; INTRAVENOUS EVERY 6 HOURS PRN
Status: DISCONTINUED | OUTPATIENT
Start: 2024-06-26 | End: 2024-06-26

## 2024-06-26 RX ORDER — METOPROLOL SUCCINATE 100 MG/1
100 TABLET, EXTENDED RELEASE ORAL DAILY
Status: DISCONTINUED | OUTPATIENT
Start: 2024-06-26 | End: 2024-06-27 | Stop reason: HOSPADM

## 2024-06-26 RX ORDER — METOCLOPRAMIDE 10 MG/1
10 TABLET ORAL EVERY 6 HOURS PRN
Status: DISCONTINUED | OUTPATIENT
Start: 2024-06-26 | End: 2024-06-26

## 2024-06-26 SDOH — SOCIAL STABILITY: SOCIAL INSECURITY: ARE THERE ANY APPARENT SIGNS OF INJURIES/BEHAVIORS THAT COULD BE RELATED TO ABUSE/NEGLECT?: NO

## 2024-06-26 SDOH — SOCIAL STABILITY: SOCIAL INSECURITY: HAVE YOU HAD THOUGHTS OF HARMING ANYONE ELSE?: NO

## 2024-06-26 SDOH — SOCIAL STABILITY: SOCIAL INSECURITY: ABUSE: ADULT

## 2024-06-26 SDOH — SOCIAL STABILITY: SOCIAL INSECURITY: HAVE YOU HAD ANY THOUGHTS OF HARMING ANYONE ELSE?: NO

## 2024-06-26 SDOH — SOCIAL STABILITY: SOCIAL INSECURITY: HAS ANYONE EVER THREATENED TO HURT YOUR FAMILY OR YOUR PETS?: NO

## 2024-06-26 SDOH — SOCIAL STABILITY: SOCIAL INSECURITY: WERE YOU ABLE TO COMPLETE ALL THE BEHAVIORAL HEALTH SCREENINGS?: YES

## 2024-06-26 SDOH — SOCIAL STABILITY: SOCIAL INSECURITY: DO YOU FEEL ANYONE HAS EXPLOITED OR TAKEN ADVANTAGE OF YOU FINANCIALLY OR OF YOUR PERSONAL PROPERTY?: NO

## 2024-06-26 SDOH — SOCIAL STABILITY: SOCIAL INSECURITY: DO YOU FEEL UNSAFE GOING BACK TO THE PLACE WHERE YOU ARE LIVING?: NO

## 2024-06-26 SDOH — SOCIAL STABILITY: SOCIAL INSECURITY: DOES ANYONE TRY TO KEEP YOU FROM HAVING/CONTACTING OTHER FRIENDS OR DOING THINGS OUTSIDE YOUR HOME?: NO

## 2024-06-26 SDOH — SOCIAL STABILITY: SOCIAL INSECURITY: ARE YOU OR HAVE YOU BEEN THREATENED OR ABUSED PHYSICALLY, EMOTIONALLY, OR SEXUALLY BY ANYONE?: NO

## 2024-06-26 ASSESSMENT — COGNITIVE AND FUNCTIONAL STATUS - GENERAL
PERSONAL GROOMING: A LITTLE
TURNING FROM BACK TO SIDE WHILE IN FLAT BAD: A LITTLE
WALKING IN HOSPITAL ROOM: A LITTLE
HELP NEEDED FOR BATHING: A LITTLE
DRESSING REGULAR LOWER BODY CLOTHING: A LITTLE
HELP NEEDED FOR BATHING: A LITTLE
WALKING IN HOSPITAL ROOM: A LOT
DAILY ACTIVITIY SCORE: 18
DRESSING REGULAR LOWER BODY CLOTHING: A LOT
DRESSING REGULAR LOWER BODY CLOTHING: A LITTLE
MOVING TO AND FROM BED TO CHAIR: A LITTLE
TOILETING: A LITTLE
MOBILITY SCORE: 16
STANDING UP FROM CHAIR USING ARMS: A LOT
PERSONAL GROOMING: A LITTLE
CLIMB 3 TO 5 STEPS WITH RAILING: A LOT
TOILETING: A LITTLE
DRESSING REGULAR UPPER BODY CLOTHING: A LITTLE
PATIENT BASELINE BEDBOUND: NO
TURNING FROM BACK TO SIDE WHILE IN FLAT BAD: A LITTLE
MOVING FROM LYING ON BACK TO SITTING ON SIDE OF FLAT BED WITH BEDRAILS: A LITTLE
STANDING UP FROM CHAIR USING ARMS: A LOT
DRESSING REGULAR UPPER BODY CLOTHING: A LOT
DAILY ACTIVITIY SCORE: 14
EATING MEALS: A LITTLE
HELP NEEDED FOR BATHING: A LOT
PERSONAL GROOMING: A LITTLE
MOBILITY SCORE: 15
EATING MEALS: A LITTLE
MOVING TO AND FROM BED TO CHAIR: A LITTLE
DAILY ACTIVITIY SCORE: 18
CLIMB 3 TO 5 STEPS WITH RAILING: A LOT
MOVING TO AND FROM BED TO CHAIR: A LITTLE
EATING MEALS: A LITTLE
MOVING FROM LYING ON BACK TO SITTING ON SIDE OF FLAT BED WITH BEDRAILS: A LITTLE
MOBILITY SCORE: 15
CLIMB 3 TO 5 STEPS WITH RAILING: A LOT
TOILETING: A LOT
STANDING UP FROM CHAIR USING ARMS: A LOT
TURNING FROM BACK TO SIDE WHILE IN FLAT BAD: A LITTLE
WALKING IN HOSPITAL ROOM: A LOT
DRESSING REGULAR UPPER BODY CLOTHING: A LITTLE
MOVING FROM LYING ON BACK TO SITTING ON SIDE OF FLAT BED WITH BEDRAILS: A LITTLE

## 2024-06-26 ASSESSMENT — PATIENT HEALTH QUESTIONNAIRE - PHQ9
2. FEELING DOWN, DEPRESSED OR HOPELESS: NOT AT ALL
SUM OF ALL RESPONSES TO PHQ9 QUESTIONS 1 & 2: 0
1. LITTLE INTEREST OR PLEASURE IN DOING THINGS: NOT AT ALL

## 2024-06-26 ASSESSMENT — ACTIVITIES OF DAILY LIVING (ADL)
ADEQUATE_TO_COMPLETE_ADL: NO
BATHING: NEEDS ASSISTANCE
GROOMING: NEEDS ASSISTANCE
FEEDING YOURSELF: INDEPENDENT
DRESSING YOURSELF: NEEDS ASSISTANCE
JUDGMENT_ADEQUATE_SAFELY_COMPLETE_DAILY_ACTIVITIES: YES
HEARING - LEFT EAR: DIFFICULTY WITH NOISE
HEARING - RIGHT EAR: DIFFICULTY WITH NOISE
PATIENT'S MEMORY ADEQUATE TO SAFELY COMPLETE DAILY ACTIVITIES?: YES
LACK_OF_TRANSPORTATION: PATIENT DECLINED
LACK_OF_TRANSPORTATION: NO
TOILETING: NEEDS ASSISTANCE
WALKS IN HOME: NEEDS ASSISTANCE

## 2024-06-26 ASSESSMENT — ENCOUNTER SYMPTOMS
GASTROINTESTINAL NEGATIVE: 1
RESPIRATORY NEGATIVE: 1
ENDOCRINE NEGATIVE: 1
DYSPNEA ON EXERTION: 0
NERVOUS/ANXIOUS: 1
EYES NEGATIVE: 1
BACK PAIN: 1
ALLERGIC/IMMUNOLOGIC NEGATIVE: 1
SYNCOPE: 0
DIZZINESS: 0
CONSTITUTIONAL NEGATIVE: 1

## 2024-06-26 ASSESSMENT — PAIN DESCRIPTION - LOCATION
LOCATION: NECK
LOCATION: CHEST

## 2024-06-26 ASSESSMENT — PAIN SCALES - GENERAL
PAINLEVEL_OUTOF10: 7
PAINLEVEL_OUTOF10: 3
PAINLEVEL_OUTOF10: 0 - NO PAIN
PAINLEVEL_OUTOF10: 8

## 2024-06-26 ASSESSMENT — LIFESTYLE VARIABLES
SUBSTANCE_ABUSE_PAST_12_MONTHS: NO
AUDIT-C TOTAL SCORE: -1
HOW OFTEN DO YOU HAVE 6 OR MORE DRINKS ON ONE OCCASION: PATIENT DECLINED
HOW MANY STANDARD DRINKS CONTAINING ALCOHOL DO YOU HAVE ON A TYPICAL DAY: PATIENT DECLINED
EVER HAD A DRINK FIRST THING IN THE MORNING TO STEADY YOUR NERVES TO GET RID OF A HANGOVER: NO
HOW OFTEN DO YOU HAVE A DRINK CONTAINING ALCOHOL: PATIENT DECLINED
AUDIT-C TOTAL SCORE: -1
TOTAL SCORE: 0
HAVE YOU EVER FELT YOU SHOULD CUT DOWN ON YOUR DRINKING: NO
HAVE PEOPLE ANNOYED YOU BY CRITICIZING YOUR DRINKING: NO
PRESCIPTION_ABUSE_PAST_12_MONTHS: NO
EVER FELT BAD OR GUILTY ABOUT YOUR DRINKING: NO
SKIP TO QUESTIONS 9-10: 0

## 2024-06-26 ASSESSMENT — PAIN - FUNCTIONAL ASSESSMENT
PAIN_FUNCTIONAL_ASSESSMENT: 0-10
PAIN_FUNCTIONAL_ASSESSMENT: 0-10

## 2024-06-26 ASSESSMENT — PAIN DESCRIPTION - DESCRIPTORS
DESCRIPTORS: DULL;ACHING
DESCRIPTORS: ACHING;DULL

## 2024-06-26 ASSESSMENT — COLUMBIA-SUICIDE SEVERITY RATING SCALE - C-SSRS
6. HAVE YOU EVER DONE ANYTHING, STARTED TO DO ANYTHING, OR PREPARED TO DO ANYTHING TO END YOUR LIFE?: NO
1. IN THE PAST MONTH, HAVE YOU WISHED YOU WERE DEAD OR WISHED YOU COULD GO TO SLEEP AND NOT WAKE UP?: NO
2. HAVE YOU ACTUALLY HAD ANY THOUGHTS OF KILLING YOURSELF?: NO

## 2024-06-26 ASSESSMENT — PAIN DESCRIPTION - ORIENTATION: ORIENTATION: LEFT

## 2024-06-26 NOTE — CONSULTS
Cardiology Consult           PATIENT NAME:  Wilian Murry       MRN: [id number]     DATE of SERVICE: June 26, 2024           Primary Care Physician: Jase Parker DO      Consulting Physician:  Dr Willams              Reason for consult:  chest pain      HPI:  This is a  73 y.o. male  smoker with hx of s/p pacemaker /SSS.HTN.HLD.TIA and anxiety who presents with chest pain  .THE pt states that he had a sudden onset of chest pain as someone sitting on his chest pain radiated to left arm around midnight .THE intensity  was 7 on 0-10   and down to 4 after nitroglycerin x 1 he denies any similar sx in the past. He has a troponin x2 ; 5-5. EKG: no acute ST/T changes ,however, The pt states that he was being told he had a Mi in the past. I can not find any information in [facility] [employer] where he said that he had a cardiologist.         EKG: A-pacing with underline sinus  no acute finding   Past Medical History:     History reviewed. No pertinent past medical history.           Past Surgical History:     History reviewed. No pertinent surgical history.               Family History:     No family history on file.         Social History:    Social History     Tobacco Use    Smoking status: Every Day     Current packs/day: 0.50     Average packs/day: 0.5 packs/day for 50.5 years (25.2 ttl pk-yrs)     Types: Cigarettes     Start date: 1974    Smokeless tobacco: Never   Substance Use Topics    Alcohol use: Never    Drug use: Never            Allergies:      No Known Allergies           Medications:     Prior to Admission Medications   Prescriptions Last Dose Informant Patient Reported? Taking?   DULoxetine (Cymbalta) 30 mg DR capsule  Other Yes No   Sig: Take 3 capsules (90 mg) by mouth once daily.   FIBER-CAPS, PSYLLIUM HUSK, ORAL  Other Yes No   Sig: Take 1 capsule by mouth every 12 hours if needed (constipation).   Stool Softener 100 mg tablet  Other Yes No   Sig: Take 1 tablet (100 mg) by mouth 2 times a day as needed  for constipation.   acetaminophen (Tylenol 8 HOUR) 650 mg ER tablet  Other Yes No   Sig: Take 1 tablet (650 mg) by mouth every 6 hours if needed for mild pain (1 - 3). Do not crush, chew, or split.   acetaminophen (Tylenol) 325 mg tablet  Other Yes No   Sig: Take 2 tablets (650 mg) by mouth every 6 hours if needed for fever (temp greater than 38.0 C) (pain or fever).   alum-mag hydroxide-simeth (Maalox MAX) 400-400-40 mg/5 mL suspension  Other Yes No   Sig: Take 30 mL by mouth every 12 hours if needed for indigestion or heartburn.   atorvastatin (Lipitor) 80 mg tablet  Other Yes No   Sig: Take 1 tablet (80 mg) by mouth once daily at bedtime.   bisacodyl (Dulcolax, bisacodyl,) 10 mg suppository  Other Yes No   Sig: Insert 1 suppository (10 mg) into the rectum once daily as needed for constipation.   buPROPion (Wellbutrin) 75 mg tablet  Other Yes No   Sig: Take 1 tablet (75 mg) by mouth once daily.   cholecalciferol (Vitamin D-3) 50 MCG (2000 UT) tablet  Other Yes No   Sig: Take 1 tablet (50 mcg) by mouth once daily.   diclofenac sodium (Voltaren) 1 % gel  Other Yes No   Sig: Apply 4.5 inches (4 g) topically 2 times a day as needed. To neck   fluticasone (Flonase) 50 mcg/actuation nasal spray  Other Yes No   Sig: Administer 1 spray into each nostril once daily.   gabapentin (Neurontin) 600 mg tablet  Other Yes No   Sig: Take 800 mg by mouth 3 times a day.   guaiFENesin 200 mg tablet  Other Yes No   Sig: Take 10 mL (200 mg) by mouth every 4 hours if needed for cough or congestion.   lidocaine (Lidoderm) 5 % patch  Other Yes No   Sig: Place 1 patch on the skin once daily. To back of neck. Remove & discard patch within 12 hours or as directed by MD.   loperamide (Imodium A-D) 2 mg tablet  Other Yes No   Sig: Take 1 tablet (2 mg) by mouth every 12 hours if needed for diarrhea.   magnesium hydroxide (Milk of Magnesia) 400 mg/5 mL suspension  Other Yes No   Sig: Take 30 mL by mouth once daily as needed for constipation.    melatonin 1 mg tablet  Other Yes No   Sig: Take 3 tablets (3 mg) by mouth as needed at bedtime for sleep.   methocarbamol (Robaxin) 750 mg tablet  Other Yes No   Sig: Take 1 tablet (750 mg) by mouth every 8 hours if needed for muscle spasms.   metoprolol succinate XL (Toprol-XL) 100 mg 24 hr tablet  Other Yes No   Sig: Take 1 tablet (100 mg) by mouth once daily.   oxyCODONE (Roxicodone) 5 mg immediate release tablet  Other Yes No   Sig: Take 1 tablet (5 mg) by mouth every 4 hours if needed (pain).   senna 8.6 mg tablet  Other Yes No   Sig: Take 1 tablet (8.6 mg) by mouth once daily at bedtime.   sucralfate (Carafate) 1 gram tablet  Other Yes No   Sig: Take 1 tablet (1 g) by mouth 4 times a day before meals.   venlafaxine (Effexor) 25 mg tablet  Other Yes No   Sig: Take 0.5 tablets (12.5 mg) by mouth once daily.      Facility-Administered Medications: None      Scheduled medications   Medication Dose Route Frequency    atorvastatin  80 mg oral Nightly    buPROPion  75 mg oral Daily    DULoxetine  90 mg oral Daily    gabapentin  800 mg oral TID    metoprolol succinate XL  100 mg oral Daily    pantoprazole  40 mg oral Daily before breakfast    Or    pantoprazole  40 mg intravenous Daily before breakfast    sennosides  1 tablet oral Nightly    sucralfate  1 g oral Before meals & nightly    venlafaxine  12.5 mg oral Daily     PRN medications   Medication    methocarbamol    nitroglycerin    ondansetron ODT    oxyCODONE    polyethylene glycol     Continuous Medications   Medication Dose Last Rate       Review of Systems   Constitutional: Negative.   HENT: Negative.     Eyes: Negative.    Cardiovascular:  Positive for chest pain. Negative for dyspnea on exertion, leg swelling and syncope.   Respiratory: Negative.     Endocrine: Negative.    Skin: Negative.    Musculoskeletal:  Positive for back pain and muscle weakness.   Gastrointestinal: Negative.    Genitourinary: Negative.    Neurological:  Negative for dizziness.  "  Psychiatric/Behavioral:  The patient is nervous/anxious.    Allergic/Immunologic: Negative.         Vitals:     /66 (BP Location: Right arm, Patient Position: Lying)   Pulse 80   Temp 36.5 °C (97.7 °F) (Temporal)   Resp 18   Ht 1.702 m (5' 7.01\")   Wt 70.3 kg (155 lb)   SpO2 95%   BMI 24.27 kg/m²     Patient Vitals for the past 24 hrs:   BP Temp Temp src Pulse Resp SpO2 Height Weight   06/26/24 0600 133/66 36.5 °C (97.7 °F) Temporal 80 18 95 % 1.702 m (5' 7.01\") 70.3 kg (155 lb)   06/26/24 0415 116/67 -- -- 74 16 97 % -- --   06/26/24 0345 102/70 -- -- 74 (!) 23 95 % -- --   06/26/24 0149 114/79 -- -- 74 15 97 % -- --        Body mass index is 24.27 kg/m².     Vitals:    06/26/24 0600   Weight: 70.3 kg (155 lb)        No intake or output data in the 24 hours ending 06/26/24 0840        Physical Exam  Constitutional:       Appearance: Normal appearance.   HENT:      Head: Normocephalic.   Cardiovascular:      Rate and Rhythm: Normal rate.   Pulmonary:      Effort: Pulmonary effort is normal.   Abdominal:      General: Abdomen is flat.   Musculoskeletal:         General: No swelling.      Cervical back: Normal range of motion.   Skin:     General: Skin is warm.   Neurological:      Mental Status: He is oriented to person, place, and time.   Psychiatric:         Mood and Affect: Mood normal.          Labs:     No results found for: \"CKTOTAL\", \"CKMB\", \"CKMBINDEX\", \"TROPONINI\"      Lab Results   Component Value Date    TROPHS 5 06/26/2024    TROPHS 5 06/26/2024      Lab Results   Component Value Date    GLUCOSE 83 06/26/2024    CALCIUM 9.4 06/26/2024     06/26/2024    K 4.6 06/26/2024    CO2 27 06/26/2024     06/26/2024    BUN 22 06/26/2024    CREATININE 1.21 06/26/2024      Lab Results   Component Value Date    WBC 9.6 06/26/2024    HGB 11.4 (L) 06/26/2024    HCT 35.9 (L) 06/26/2024    MCV 91 06/26/2024     06/26/2024        LABS:  ABGs: No results found for: \"PH\"  PRO-BNP: No results " "found for: \"PROBNP\"   TSH: No results found for: \"TSH\"   Lipid Profile: No results found for: \"TRIG\", \"HDL\", \"LDLCALC\", \"CHOL\"   Hemoglobin A1C: No results found for: \"HGBA1C\"   Magnesium:    Lab Results   Component Value Date    MG 2.11 06/26/2024              Assessment/Plan:     1.Chest pain; A 73 y.o. male former smoker with hx of s/p pacemaker /SSS.HTN.HLD.TIA and anxiety who presents with chest pain detailed in HPI  .however, He has a troponin x2 ; 5-5. EKG: no acute ST/T changes ,    -complete trending troponin ,check troponin now  -echo to assess LV function many structure disease   -continue lipitor ,metoprolol   -Lexiscan nuclear stress test today giving typical chest pain , however, EKG and troponin;negative, multiple risk factors    Additional recommendation will follow after stress test     2, s/p Pacemaker /SSS, remote in 2004;A-pacing, follow as outpt      -  Consults  "

## 2024-06-26 NOTE — NURSING NOTE
Patient to discharge back to [facility]. Transport scheduled for 730pm. Report called to Kimberli.

## 2024-06-26 NOTE — DISCHARGE INSTRUCTIONS
Was a pleasure to meet you in the hospital  You presented with having chest pain, this was not determined to be due to your heart given normal stress test as well as normal pumping function, or left ventricular ejection fraction, assessment  None of your medications are recommended to be changed, you are deemed to medically cleared to discharge from the hospital and may continue working with your usual/outpatient doctors to get more optimized management of your chronic pains  It was also recommended that you can use lidocaine patches to your spine that may help to alleviate some of your symptoms; as we discussed at the bedside this can be done in conjunction with using a heating pad that may make the lidocaine patch or little more effective, you would for example use a heating pad for 15 to 20 minutes followed by applying the lidocaine patch
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
## 2024-06-26 NOTE — CARE PLAN
"The patient's goals for the shift include \"to get medicine for pain.\"    The clinical goals for the shift include Patient will have no further episodes of CP through end of shift.    Patient to discharge back to long term care facility this evening.    "

## 2024-06-26 NOTE — H&P
History Of Present Illness  Wilian Murry is a 73 y.o. male presenting with chest pain that began at approximately midnight.  Patient states that the pain was initially described as a 4-5 out of 10, substernally, and pressure.  This improved with 3 out of 10 following administration of nitroglycerin.  EMS notes that at the time of their arrival, patient appeared to be in no acute distress, was ambulating independently, did not seem to be experiencing any kind of significant cardiac event.  Upon arrival to this facility, EKG obtained showed no ischemic injury pattern.  High-sensitivity troponins have been negative x 2.  However due to patient's reported improvement with sublingual nitroglycerin as well as his elevated heart score, to be admitted for further observation..     Past Medical History  History reviewed. No pertinent past medical history.    Surgical History  History reviewed. No pertinent surgical history.     Social History  He reports that he has been smoking cigarettes. He started smoking about 50 years ago. He has a 25.2 pack-year smoking history. He has never used smokeless tobacco. He reports that he does not drink alcohol and does not use drugs.    Family History  No family history on file.     Allergies  Patient has no known allergies.    Review of Systems   Cardiovascular:  Positive for chest pain.        Physical Exam  Vitals reviewed.   Constitutional:       Appearance: Normal appearance.   HENT:      Head: Normocephalic and atraumatic.      Nose: Nose normal.      Mouth/Throat:      Mouth: Mucous membranes are moist.   Eyes:      Extraocular Movements: Extraocular movements intact.      Conjunctiva/sclera: Conjunctivae normal.      Pupils: Pupils are equal, round, and reactive to light.   Cardiovascular:      Rate and Rhythm: Normal rate and regular rhythm.      Pulses: Normal pulses.      Heart sounds: Normal heart sounds.   Pulmonary:      Effort: Pulmonary effort is normal.       Breath sounds: Normal breath sounds.   Abdominal:      General: Bowel sounds are normal.      Palpations: Abdomen is soft.   Musculoskeletal:         General: Normal range of motion.      Cervical back: Normal range of motion and neck supple.   Skin:     General: Skin is warm and dry.   Neurological:      General: No focal deficit present.      Mental Status: He is alert. Mental status is at baseline.   Psychiatric:         Mood and Affect: Mood normal.         Behavior: Behavior normal.          Last Recorded Vitals  Blood pressure 102/70, pulse 74, resp. rate (!) 23, SpO2 95%.    Relevant Results  Scheduled medications  atorvastatin, 80 mg, oral, Nightly  buPROPion, 75 mg, oral, Daily  DULoxetine, 90 mg, oral, Daily  gabapentin, 800 mg, oral, TID  metoprolol succinate XL, 100 mg, oral, Daily  pantoprazole, 40 mg, oral, Daily before breakfast   Or  pantoprazole, 40 mg, intravenous, Daily before breakfast  sennosides, 1 tablet, oral, Nightly  sucralfate, 1 g, oral, Before meals & nightly  venlafaxine, 12.5 mg, oral, Daily      Continuous medications     PRN medications  PRN medications: acetaminophen **OR** acetaminophen **OR** acetaminophen, acetaminophen **OR** acetaminophen **OR** acetaminophen, melatonin, methocarbamol, nitroglycerin, ondansetron ODT **OR** ondansetron, oxyCODONE, polyethylene glycol  XR chest 1 view    Result Date: 6/26/2024  Interpreted By:  Juan Mazariegos, STUDY: XR CHEST 1 VIEW;  6/26/2024 2:20 am   INDICATION: Signs/Symptoms:CP.   COMPARISON: None.   ACCESSION NUMBER(S): [id number]   ORDERING CLINICIAN: ALBERTO LOGAN   FINDINGS: Dual lead right-sided pacemaker. A 1.3 cm metallic foreign body projects over the medial right upper lobe, of indeterminate location in the AP dimension. Additional metallic foreign bodies project over the right upper lung, right midlung, medial right lung base/right lower heart border, and right upper quadrant.   The cardiomediastinal silhouette and  pulmonary vasculature are within normal limits. Minimal bibasilar atelectasis. No consolidation, pleural effusion or pneumothorax.   Chronic fracture deformity of the right mid clavicle.       Minimal bibasilar atelectasis with otherwise no acute process.     MACRO: None.   Signed by: Juan Mazariegos 6/26/2024 3:01 AM Dictation workstation:   [id number]   Results for orders placed or performed during the hospital encounter of 06/26/24 (from the past 24 hour(s))   Protime-INR   Result Value Ref Range    Protime 11.6 9.8 - 12.8 seconds    INR 1.0 0.9 - 1.1   Magnesium   Result Value Ref Range    Magnesium 2.11 1.60 - 2.40 mg/dL   Comprehensive Metabolic Panel   Result Value Ref Range    Glucose 83 74 - 99 mg/dL    Sodium 138 136 - 145 mmol/L    Potassium 4.6 3.5 - 5.3 mmol/L    Chloride 106 98 - 107 mmol/L    Bicarbonate 27 21 - 32 mmol/L    Anion Gap 10 10 - 20 mmol/L    Urea Nitrogen 22 6 - 23 mg/dL    Creatinine 1.21 0.50 - 1.30 mg/dL    eGFR 64 >60 mL/min/1.73m*2    Calcium 9.4 8.6 - 10.3 mg/dL    Albumin 4.0 3.4 - 5.0 g/dL    Alkaline Phosphatase 68 33 - 136 U/L    Total Protein 6.5 6.4 - 8.2 g/dL    AST 17 9 - 39 U/L    Bilirubin, Total 0.3 0.0 - 1.2 mg/dL    ALT 18 10 - 52 U/L   CBC and Auto Differential   Result Value Ref Range    WBC 9.6 4.4 - 11.3 x10*3/uL    nRBC 0.0 0.0 - 0.0 /100 WBCs    RBC 3.94 (L) 4.50 - 5.90 x10*6/uL    Hemoglobin 11.4 (L) 13.5 - 17.5 g/dL    Hematocrit 35.9 (L) 41.0 - 52.0 %    MCV 91 80 - 100 fL    MCH 28.9 26.0 - 34.0 pg    MCHC 31.8 (L) 32.0 - 36.0 g/dL    RDW 16.9 (H) 11.5 - 14.5 %    Platelets 185 150 - 450 x10*3/uL    Neutrophils % 45.3 40.0 - 80.0 %    Immature Granulocytes %, Automated 0.2 0.0 - 0.9 %    Lymphocytes % 35.1 13.0 - 44.0 %    Monocytes % 13.7 2.0 - 10.0 %    Eosinophils % 5.2 0.0 - 6.0 %    Basophils % 0.5 0.0 - 2.0 %    Neutrophils Absolute 4.37 1.60 - 5.50 x10*3/uL    Immature Granulocytes Absolute, Automated 0.02 0.00 - 0.50 x10*3/uL    Lymphocytes  Absolute 3.38 (H) 0.80 - 3.00 x10*3/uL    Monocytes Absolute 1.32 (H) 0.05 - 0.80 x10*3/uL    Eosinophils Absolute 0.50 (H) 0.00 - 0.40 x10*3/uL    Basophils Absolute 0.05 0.00 - 0.10 x10*3/uL   B-Type Natriuretic Peptide   Result Value Ref Range    BNP 80 0 - 99 pg/mL   Troponin I, High Sensitivity, Initial   Result Value Ref Range    Troponin I, High Sensitivity 5 0 - 20 ng/L   Troponin, High Sensitivity, 1 Hour   Result Value Ref Range    Troponin I, High Sensitivity 5 0 - 20 ng/L       Assessment/Plan   Principal Problem:    Chest pain  Active Problems:    Cervical neuropathy    Primary hypertension    Anxiety      -Patient admitted for evaluation of progressive chest pain, ACS rule out  -Cardiology consultation has been placed, appreciate recommendations and management  -Patient has been given full dose aspirin.  Continue 81 mg of aspirin daily  -High intensity statin.   -Cardiac/low-salt diet  -Maintain optimal cardiac electrolytes, potassium >4 and magnesium >2  -Maintain telemetry  -PT/OT, as indicated  -DVT prophylaxis as ordered       I spent >75 minutes in the professional and overall care of this patient.      Rodolfo Sheffield, DO

## 2024-06-26 NOTE — TELEPHONE ENCOUNTER
Please schedule transesophageal echocardiogram without anesthesia sometime in the next month.    SDH

## 2024-06-26 NOTE — DISCHARGE SUMMARY
Discharge Diagnosis  Chest pain    Issues Requiring Follow-Up  Was a pleasure to meet you in the hospital  You presented with having chest pain, this was not determined to be due to your heart given normal stress test as well as normal pumping function, or left ventricular ejection fraction, assessment  None of your medications are recommended to be changed, you are deemed to medically cleared to discharge from the hospital and may continue working with your usual/outpatient doctors to get more optimized management of your chronic pains  It was also recommended that you can use lidocaine patches to your spine that may help to alleviate some of your symptoms; as we discussed at the bedside this can be done in conjunction with using a heating pad that may make the lidocaine patch or little more effective, you would for example use a heating pad for 15 to 20 minutes followed by applying the lidocaine patch       Test Results Pending At Discharge  Pending Labs       No current pending labs.            Hospital Course  Wilian Murry is a 73 y.o. male presenting with chest pain that began at approximately midnight.  Patient states that the pain was initially described as a 4-5 out of 10, substernally, and pressure.  This improved with 3 out of 10 following administration of nitroglycerin.  EMS notes that at the time of their arrival, patient appeared to be in no acute distress, was ambulating independently, did not seem to be experiencing any kind of significant cardiac event.  Upon arrival to this facility, EKG obtained showed no ischemic injury pattern.  High-sensitivity troponins have been negative x 2.  However due to patient's reported improvement with sublingual nitroglycerin as well as his elevated heart score, to be admitted for further observation.    Patient was seen in conjunction with cardiology consultation, he had a normal nuclear stress test with noted normal ejection fraction, he will discharge and  was recommended to use a lidocaine patch with the above-mentioned instruction, none of his other medications were changed.    Gordo Moreno MD  [facility]  Internal Medicine    This document was generated in whole or in part using the Dragon One medical voice recognition software and there may be some incorrect words/wording, spelling, or punctuation errors that were not corrected prior to finalization in the medical record.    Pertinent Physical Exam At Time of Discharge  Physical Exam  Vitals reviewed.   Constitutional:       Appearance: Normal appearance.   HENT:      Head: Normocephalic and atraumatic.      Nose: Nose normal.      Mouth/Throat:      Mouth: Mucous membranes are moist.   Eyes:      Extraocular Movements: Extraocular movements intact.      Conjunctiva/sclera: Conjunctivae normal.      Pupils: Pupils are equal, round, and reactive to light.   Cardiovascular:      Rate and Rhythm: Normal rate and regular rhythm.      Pulses: Normal pulses.      Heart sounds: Normal heart sounds.   Pulmonary:      Effort: Pulmonary effort is normal.      Breath sounds: Normal breath sounds.   Abdominal:      General: Bowel sounds are normal.      Palpations: Abdomen is soft.   Musculoskeletal:         General: Normal range of motion.      Cervical back: Normal range of motion and neck supple.   Skin:     General: Skin is warm and dry.   Neurological:      General: No focal deficit present.      Mental Status: He is alert. Mental status is at baseline.   Psychiatric:         Mood and Affect: Mood normal.         Behavior: Behavior normal.     Home Medications     Medication List      ASK your doctor about these medications     * acetaminophen 325 mg tablet; Commonly known as: Tylenol   * acetaminophen 650 mg ER tablet; Commonly known as: Tylenol 8 HOUR   alum-mag hydroxide-simeth 400-400-40 mg/5 mL suspension; Commonly known   as: Maalox MAX   atorvastatin 80 mg tablet; Commonly known as: Lipitor    buPROPion 75 mg tablet; Commonly known as: Wellbutrin   cholecalciferol 50 MCG (2000 UT) tablet; Commonly known as: Vitamin D-3   diclofenac sodium 1 % gel; Commonly known as: Voltaren   docusate sodium 100 mg capsule; Commonly known as: Colace; Ask about:   Which instructions should I use?   Dulcolax (bisacodyl) 10 mg suppository; Generic drug: bisacodyl; Ask   about: Which instructions should I use?   DULoxetine 30 mg DR capsule; Commonly known as: Cymbalta   fluticasone 50 mcg/actuation nasal spray; Commonly known as: Flonase   gabapentin 800 mg tablet; Commonly known as: Neurontin   guaiFENesin 100 mg/5 mL syrup; Commonly known as: Robitussin; Ask about:   Which instructions should I use?   lidocaine 5 % patch; Commonly known as: Lidoderm   loperamide 2 mg tablet; Commonly known as: Imodium A-D   magnesium hydroxide 400 mg/5 mL suspension; Commonly known as: Milk of   Magnesia   melatonin 3 mg tablet   methocarbamol 750 mg tablet; Commonly known as: Robaxin   metoprolol succinate  mg 24 hr capsule; Commonly known as:   Kapspargo Sprinkle; Ask about: Which instructions should I use?   oxyCODONE 5 mg immediate release tablet; Commonly known as: Roxicodone   psyllium 0.4 gram capsule; Commonly known as: Metamucil; Ask about:   Which instructions should I use?   senna 8.6 mg tablet; Generic drug: sennosides   sucralfate 1 gram tablet; Commonly known as: Carafate   venlafaxine 25 mg tablet; Commonly known as: Effexor  * This list has 2 medication(s) that are the same as other medications   prescribed for you. Read the directions carefully, and ask your doctor or   other care provider to review them with you.       Outpatient Follow-Up  No future appointments.    Gordo Moreno MD

## 2024-06-26 NOTE — PROGRESS NOTES
"Answering service called me at 1am, this is a Dr Parker patient and they stated they were trying to reach him for an hour and they couldn't so they called me.  I advised given the emergency situation to do what is in the patient's best interest I will take the call even though I am not on call for Dr Parker today.  The nurse reports the patient is having active chest pain, is full code and \"needs to be sent out\"  I agree and told nurse call 911 now.  Dr Parker updated of above for follow up with patient  "

## 2024-06-26 NOTE — PROGRESS NOTES
Pharmacy Medication History Review    Wilian Murry is a 73 y.o. male admitted for Chest pain. Pharmacy reviewed the patient's mawxu-of-fbddcpuic medications and allergies for accuracy.    The list below reflects the updated PTA list. Comments regarding how patient may be taking medications differently can be found in the Admit Orders Activity  Prior to Admission Medications   Prescriptions Last Dose Informant Patient Reported? Taking?   DULoxetine (Cymbalta) 30 mg DR capsule Unknown Other Yes Yes   Sig: Take 3 capsules (90 mg) by mouth once daily.   FIBER-CAPS, PSYLLIUM HUSK, ORAL Unknown Other Yes Yes   Sig: Take 1 capsule by mouth every 12 hours if needed (constipation).   Stool Softener 100 mg tablet Unknown Other Yes Yes   Sig: Take 1 tablet (100 mg) by mouth 2 times a day as needed for constipation.   acetaminophen (Tylenol 8 HOUR) 650 mg ER tablet Unknown Other Yes Yes   Sig: Take 1 tablet (650 mg) by mouth every 6 hours if needed for mild pain (1 - 3). Do not crush, chew, or split.   acetaminophen (Tylenol) 325 mg tablet Unknown Other Yes Yes   Sig: Take 2 tablets (650 mg) by mouth every 6 hours if needed for fever (temp greater than 38.0 C) (pain or fever).   alum-mag hydroxide-simeth (Maalox MAX) 400-400-40 mg/5 mL suspension Unknown Other Yes Yes   Sig: Take 30 mL by mouth every 12 hours if needed for indigestion or heartburn.   atorvastatin (Lipitor) 80 mg tablet Unknown Other Yes Yes   Sig: Take 1 tablet (80 mg) by mouth once daily at bedtime.   bisacodyl (Dulcolax, bisacodyl,) 10 mg suppository Unknown Other Yes Yes   Sig: Insert 1 suppository (10 mg) into the rectum once daily as needed for constipation.   buPROPion (Wellbutrin) 75 mg tablet Unknown Other Yes Yes   Sig: Take 1 tablet (75 mg) by mouth once daily.   cholecalciferol (Vitamin D-3) 50 MCG (2000 UT) tablet Unknown Other Yes Yes   Sig: Take 1 tablet (50 mcg) by mouth once daily.   diclofenac sodium (Voltaren) 1 % gel Unknown Other Yes  Yes   Sig: Apply 4.5 inches (4 g) topically 2 times a day as needed. To neck   fluticasone (Flonase) 50 mcg/actuation nasal spray Unknown Other Yes Yes   Sig: Administer 1 spray into each nostril once daily.   gabapentin (Neurontin) 600 mg tablet Unknown Other Yes Yes   Sig: Take 800 mg by mouth 3 times a day.   guaiFENesin 200 mg tablet Unknown Other Yes Yes   Sig: Take 10 mL (200 mg) by mouth every 4 hours if needed for cough or congestion.   lidocaine (Lidoderm) 5 % patch Unknown Other Yes Yes   Sig: Place 1 patch on the skin once daily. To back of neck. Remove & discard patch within 12 hours or as directed by MD.   loperamide (Imodium A-D) 2 mg tablet Unknown Other Yes Yes   Sig: Take 1 tablet (2 mg) by mouth every 12 hours if needed for diarrhea.   magnesium hydroxide (Milk of Magnesia) 400 mg/5 mL suspension Unknown Other Yes Yes   Sig: Take 30 mL by mouth once daily as needed for constipation.   melatonin 1 mg tablet Unknown Other Yes Yes   Sig: Take 3 tablets (3 mg) by mouth as needed at bedtime for sleep.   methocarbamol (Robaxin) 750 mg tablet Unknown Other Yes Yes   Sig: Take 1 tablet (750 mg) by mouth every 8 hours if needed for muscle spasms.   metoprolol succinate XL (Toprol-XL) 100 mg 24 hr tablet Unknown Other Yes Yes   Sig: Take 1 tablet (100 mg) by mouth once daily.   oxyCODONE (Roxicodone) 5 mg immediate release tablet Unknown Other Yes Yes   Sig: Take 1 tablet (5 mg) by mouth every 4 hours if needed (pain).   senna 8.6 mg tablet Unknown Other Yes Yes   Sig: Take 1 tablet (8.6 mg) by mouth once daily at bedtime.   sucralfate (Carafate) 1 gram tablet Unknown Other Yes Yes   Sig: Take 1 tablet (1 g) by mouth 4 times a day before meals.   venlafaxine (Effexor) 25 mg tablet Unknown Other Yes Yes   Sig: Take 0.5 tablets (12.5 mg) by mouth once daily.      Facility-Administered Medications: None        The list below reflects the updated allergy list. Please review each documented allergy for additional  clarification and justification.  Allergies  Reviewed by Low Fontaine RN on 2024   No Known Allergies         Patient was unable to be assessed for M2B at discharge. Pharmacy has been updated to N/A - patient is from facility.    Sources used to complete the med history include   [facility] Order Summary Report printed and faxed from [facility] 24 05:04:16 ET    Medications ADDED:  Dulcolax suppository  Guaifenesin Oral Liquid 200mg/10ml  Milk of Magnesia 400mg/10ml  Mylanta [id number]-[id number]/5ml  Fiber-Caps, Psyllium husk oral  Tylenol 650mg tablet  Venlafaxine 25mg  Medications CHANGED:  Bupropion 150mg XL tablet changed to 75mg tablet  Duloxetine 60mg capsule (si caps every day) changed 30mg capsule (simg Qday)  Loperamide frequency changed from PRN to Q12h PRN  Methocarbamol sig changed from Q8h PRN to TID  Medications REMOVED:   Bisacodyl 5mg tablet  Cyclobenzaprine 5mg tab  Non-Formulary Biofreeze 4%  Pantoprazole 40mg   Sodium phosphates Fleet    Below are additional concerns with the patient's PTA list.  Currently on duloxetine taper schedule as follows:  90mg daily -7/3, then 60mg daily -, then 40mg daily -, then 20mg daily -, then end  Currently on Venlafaxine titration schedule as follows  12.5mg daily -7/3, then 25mg daily -, then 37.5mg -ongoing    Deidre Santos, PharmD   Transitions of Care Pharmacist  [facility]  Please reach out via Secure Chat for questions, or if no response call [employer] or [employer]

## 2024-06-26 NOTE — PROGRESS NOTES
06/26/24 1537   Discharge Planning   Living Arrangements Alone   Assistance Needed yes   Type of Residence Nursing home/residential care   Home or Post Acute Services Post acute facilities (Rehab/SNF/etc)   Type of Post Acute Facility Services Long term care   Patient expects to be discharged to: Return to [facility]   Does the patient need discharge transport arranged? Yes   RoundTrip coordination needed? Yes   Has discharge transport been arranged? No   Housing Stability   In the last 12 months, was there a time when you were not able to pay the mortgage or rent on time? N   In the last 12 months, was there a time when you did not have a steady place to sleep or slept in a shelter (including now)? N   Transportation Needs   In the past 12 months, has lack of transportation kept you from medical appointments or from getting medications? no   In the past 12 months, has lack of transportation kept you from meetings, work, or from getting things needed for daily living? No   Patient Choice   Patient / Family choosing to utilize agency / facility established prior to hospitalization Yes     Spoke to patient at bedside to explain my role in discharge planning. Patient states he is LTC at [facility] and plans to return to facility when medically ready. Return referral sent to facility.    1545: [facility] verified patient is LTC at their facility and has a bed hold.

## 2024-06-26 NOTE — PROGRESS NOTES
Attempt to meet with patient at bedside. Patient currently off the floor for testing. Will attempt again at a later time.     1450: Attempt to meet with patient at bedside. Patient currently off floor for testing. Will attempt again at a later time.

## 2024-06-26 NOTE — PROGRESS NOTES
Pt has a dc order and [facility] can accept back today.  [place], AVS and MAR sent through [facility].  Transportation arranged for 7:30pm; facility, bedside RN and pt all aware (pt's states he has no family to call/update).

## 2024-06-27 NOTE — NURSING NOTE
2312  Patient awaiting  from [facility] since 1930.  called numerous times . Time postponed tp patients dismay.

## 2024-06-28 LAB
ATRIAL RATE: 75 BPM
P AXIS: 7 DEGREES
P OFFSET: 200 MS
P ONSET: 161 MS
PR INTERVAL: 202 MS
Q ONSET: 216 MS
QRS COUNT: 13 BEATS
QRS DURATION: 112 MS
QT INTERVAL: 402 MS
QTC CALCULATION(BAZETT): 448 MS
QTC FREDERICIA: 432 MS
R AXIS: 55 DEGREES
T AXIS: 40 DEGREES
T OFFSET: 417 MS
VENTRICULAR RATE: 75 BPM

## 2024-07-08 ENCOUNTER — HOSPITAL ENCOUNTER (EMERGENCY)
Facility: HOSPITAL | Age: 74
Discharge: PSYCHIATRIC OR SUBSTANCE USE FACILITY | End: 2024-07-09
Attending: EMERGENCY MEDICINE
Payer: COMMERCIAL

## 2024-07-08 ENCOUNTER — APPOINTMENT (OUTPATIENT)
Dept: CARDIOLOGY | Facility: HOSPITAL | Age: 74
End: 2024-07-08
Payer: COMMERCIAL

## 2024-07-08 DIAGNOSIS — R45.851 SUICIDAL IDEATION: Primary | ICD-10-CM

## 2024-07-08 LAB
ALBUMIN SERPL BCP-MCNC: 4.1 G/DL (ref 3.4–5)
ALP SERPL-CCNC: 64 U/L (ref 33–136)
ALT SERPL W P-5'-P-CCNC: 15 U/L (ref 10–52)
AMPHETAMINES UR QL SCN: ABNORMAL
ANION GAP SERPL CALC-SCNC: 11 MMOL/L (ref 10–20)
APAP SERPL-MCNC: <10 UG/ML
AST SERPL W P-5'-P-CCNC: 16 U/L (ref 9–39)
ATRIAL RATE: 92 BPM
BARBITURATES UR QL SCN: ABNORMAL
BASOPHILS # BLD AUTO: 0.08 X10*3/UL (ref 0–0.1)
BASOPHILS NFR BLD AUTO: 0.8 %
BENZODIAZ UR QL SCN: ABNORMAL
BILIRUB SERPL-MCNC: 0.3 MG/DL (ref 0–1.2)
BUN SERPL-MCNC: 15 MG/DL (ref 6–23)
BZE UR QL SCN: ABNORMAL
CALCIUM SERPL-MCNC: 9.3 MG/DL (ref 8.6–10.3)
CANNABINOIDS UR QL SCN: ABNORMAL
CHLORIDE SERPL-SCNC: 108 MMOL/L (ref 98–107)
CO2 SERPL-SCNC: 23 MMOL/L (ref 21–32)
CREAT SERPL-MCNC: 1.03 MG/DL (ref 0.5–1.3)
EGFRCR SERPLBLD CKD-EPI 2021: 78 ML/MIN/1.73M*2
EOSINOPHIL # BLD AUTO: 0.45 X10*3/UL (ref 0–0.4)
EOSINOPHIL NFR BLD AUTO: 4.4 %
ERYTHROCYTE [DISTWIDTH] IN BLOOD BY AUTOMATED COUNT: 16.7 % (ref 11.5–14.5)
ETHANOL SERPL-MCNC: <10 MG/DL
FENTANYL+NORFENTANYL UR QL SCN: ABNORMAL
GLUCOSE SERPL-MCNC: 73 MG/DL (ref 74–99)
HCT VFR BLD AUTO: 37.5 % (ref 41–52)
HGB BLD-MCNC: 12 G/DL (ref 13.5–17.5)
IMM GRANULOCYTES # BLD AUTO: 0.02 X10*3/UL (ref 0–0.5)
IMM GRANULOCYTES NFR BLD AUTO: 0.2 % (ref 0–0.9)
LYMPHOCYTES # BLD AUTO: 3.48 X10*3/UL (ref 0.8–3)
LYMPHOCYTES NFR BLD AUTO: 33.7 %
MCH RBC QN AUTO: 29.5 PG (ref 26–34)
MCHC RBC AUTO-ENTMCNC: 32 G/DL (ref 32–36)
MCV RBC AUTO: 92 FL (ref 80–100)
METHADONE UR QL SCN: ABNORMAL
MONOCYTES # BLD AUTO: 1.83 X10*3/UL (ref 0.05–0.8)
MONOCYTES NFR BLD AUTO: 17.7 %
NEUTROPHILS # BLD AUTO: 4.48 X10*3/UL (ref 1.6–5.5)
NEUTROPHILS NFR BLD AUTO: 43.2 %
NRBC BLD-RTO: 0 /100 WBCS (ref 0–0)
OPIATES UR QL SCN: ABNORMAL
OXYCODONE+OXYMORPHONE UR QL SCN: ABNORMAL
P AXIS: 49 DEGREES
P OFFSET: 138 MS
P ONSET: 104 MS
PCP UR QL SCN: ABNORMAL
PLATELET # BLD AUTO: 160 X10*3/UL (ref 150–450)
POTASSIUM SERPL-SCNC: 3.9 MMOL/L (ref 3.5–5.3)
PR INTERVAL: 240 MS
PROT SERPL-MCNC: 6.4 G/DL (ref 6.4–8.2)
Q ONSET: 224 MS
QRS COUNT: 15 BEATS
QRS DURATION: 100 MS
QT INTERVAL: 380 MS
QTC CALCULATION(BAZETT): 464 MS
QTC FREDERICIA: 435 MS
R AXIS: 59 DEGREES
RBC # BLD AUTO: 4.07 X10*6/UL (ref 4.5–5.9)
SALICYLATES SERPL-MCNC: <3 MG/DL
SODIUM SERPL-SCNC: 138 MMOL/L (ref 136–145)
T AXIS: 43 DEGREES
T OFFSET: 414 MS
VENTRICULAR RATE: 90 BPM
WBC # BLD AUTO: 10.3 X10*3/UL (ref 4.4–11.3)

## 2024-07-08 PROCEDURE — 85025 COMPLETE CBC W/AUTO DIFF WBC: CPT | Performed by: EMERGENCY MEDICINE

## 2024-07-08 PROCEDURE — 80307 DRUG TEST PRSMV CHEM ANLYZR: CPT | Performed by: EMERGENCY MEDICINE

## 2024-07-08 PROCEDURE — 2500000001 HC RX 250 WO HCPCS SELF ADMINISTERED DRUGS (ALT 637 FOR MEDICARE OP)

## 2024-07-08 PROCEDURE — 99214 OFFICE O/P EST MOD 30 MIN: CPT | Performed by: PSYCHIATRY & NEUROLOGY

## 2024-07-08 PROCEDURE — 80143 DRUG ASSAY ACETAMINOPHEN: CPT | Performed by: EMERGENCY MEDICINE

## 2024-07-08 PROCEDURE — 36415 COLL VENOUS BLD VENIPUNCTURE: CPT | Performed by: EMERGENCY MEDICINE

## 2024-07-08 PROCEDURE — 2500000001 HC RX 250 WO HCPCS SELF ADMINISTERED DRUGS (ALT 637 FOR MEDICARE OP): Performed by: EMERGENCY MEDICINE

## 2024-07-08 PROCEDURE — 99285 EMERGENCY DEPT VISIT HI MDM: CPT

## 2024-07-08 PROCEDURE — 93005 ELECTROCARDIOGRAM TRACING: CPT

## 2024-07-08 PROCEDURE — 80053 COMPREHEN METABOLIC PANEL: CPT | Performed by: EMERGENCY MEDICINE

## 2024-07-08 RX ORDER — DULOXETIN HYDROCHLORIDE 20 MG/1
20 CAPSULE, DELAYED RELEASE ORAL DAILY PRN
COMMUNITY
Start: 2024-07-20 | End: 2024-07-27

## 2024-07-08 RX ORDER — OXYCODONE HYDROCHLORIDE 5 MG/1
5 TABLET ORAL ONCE
Status: COMPLETED | OUTPATIENT
Start: 2024-07-08 | End: 2024-07-08

## 2024-07-08 RX ORDER — OXYCODONE HYDROCHLORIDE 5 MG/1
5 TABLET ORAL
COMMUNITY
Start: 2024-07-10 | End: 2024-07-17

## 2024-07-08 RX ORDER — VENLAFAXINE 37.5 MG/1
37.5 TABLET ORAL DAILY
COMMUNITY
Start: 2024-07-12

## 2024-07-08 RX ORDER — DULOXETINE 40 MG/1
40 CAPSULE, DELAYED RELEASE ORAL DAILY PRN
COMMUNITY
Start: 2024-07-12 | End: 2024-07-19

## 2024-07-08 RX ORDER — DEXTROMETHORPHAN HYDROBROMIDE, GUAIFENESIN 5; 100 MG/5ML; MG/5ML
650 LIQUID ORAL EVERY 6 HOURS PRN
COMMUNITY

## 2024-07-08 SDOH — HEALTH STABILITY: MENTAL HEALTH: HAVE YOU WISHED YOU WERE DEAD OR WISHED YOU COULD GO TO SLEEP AND NOT WAKE UP?: YES

## 2024-07-08 SDOH — HEALTH STABILITY: MENTAL HEALTH

## 2024-07-08 SDOH — HEALTH STABILITY: MENTAL HEALTH: DELUSIONS: OTHER (COMMENT)

## 2024-07-08 SDOH — HEALTH STABILITY: MENTAL HEALTH: HAVE YOU HAD THESE THOUGHTS AND HAD SOME INTENTION OF ACTING ON THEM?: YES

## 2024-07-08 SDOH — HEALTH STABILITY: MENTAL HEALTH
HAVE YOU STARTED TO WORK OUT OR WORKED OUT THE DETAILS OF HOW TO KILL YOURSELF? DO YOU INTENT TO CARRY OUT THIS PLAN?: YES

## 2024-07-08 SDOH — HEALTH STABILITY: MENTAL HEALTH: RISK OF SUICIDE: HIGH RISK

## 2024-07-08 SDOH — HEALTH STABILITY: MENTAL HEALTH: HAVE YOU HAD THESE THOUGHTS AND HAD SOME INTENTION OF ACTING ON THEM?: NO

## 2024-07-08 SDOH — HEALTH STABILITY: MENTAL HEALTH: BEHAVIORS/MOOD: ANXIOUS;COOPERATIVE

## 2024-07-08 SDOH — HEALTH STABILITY: MENTAL HEALTH: SLEEP PATTERN: UNABLE TO ASSESS

## 2024-07-08 SDOH — HEALTH STABILITY: MENTAL HEALTH: HAVE YOU ACTUALLY HAD ANY THOUGHTS OF KILLING YOURSELF?: YES

## 2024-07-08 SDOH — HEALTH STABILITY: MENTAL HEALTH: CONTENT: UNREMARKABLE

## 2024-07-08 SDOH — HEALTH STABILITY: MENTAL HEALTH: HAVE YOU BEEN THINKING ABOUT HOW YOU MIGHT DO THIS?: YES

## 2024-07-08 SDOH — HEALTH STABILITY: MENTAL HEALTH: HAVE YOU EVER DONE ANYTHING, STARTED TO DO ANYTHING, OR PREPARED TO DO ANYTHING TO END YOUR LIFE?: YES

## 2024-07-08 SDOH — HEALTH STABILITY: MENTAL HEALTH: HAVE YOU EVER DONE ANYTHING, STARTED TO DO ANYTHING, OR PREPARED TO DO ANYTHING TO END YOUR LIFE?: NO

## 2024-07-08 SDOH — HEALTH STABILITY: MENTAL HEALTH: BEHAVIORS/MOOD: COOPERATIVE;CALM

## 2024-07-08 SDOH — HEALTH STABILITY: MENTAL HEALTH: SUICIDE ASSESSMENT: ADULT (C-SSRS)

## 2024-07-08 SDOH — HEALTH STABILITY: MENTAL HEALTH: BEHAVIORS/MOOD: SAD;COOPERATIVE;IRRITABLE

## 2024-07-08 SDOH — HEALTH STABILITY: MENTAL HEALTH
HAVE YOU STARTED TO WORK OUT OR WORKED OUT THE DETAILS OF HOW TO KILL YOURSELF? DO YOU INTENT TO CARRY OUT THIS PLAN?: NO

## 2024-07-08 SDOH — HEALTH STABILITY: MENTAL HEALTH: RISK OF SUICIDE: NO RISK

## 2024-07-08 SDOH — HEALTH STABILITY: MENTAL HEALTH: BEHAVIORS/MOOD: COOPERATIVE

## 2024-07-08 SDOH — HEALTH STABILITY: MENTAL HEALTH: HAVE YOU WISHED YOU WERE DEAD OR WISHED YOU COULD GO TO SLEEP AND NOT WAKE UP?: NO

## 2024-07-08 SDOH — HEALTH STABILITY: MENTAL HEALTH: WAS THIS WITHIN THE PAST THREE MONTHS?: YES

## 2024-07-08 SDOH — HEALTH STABILITY: MENTAL HEALTH: HAVE YOU ACTUALLY HAD ANY THOUGHTS OF KILLING YOURSELF?: NO

## 2024-07-08 SDOH — SOCIAL STABILITY: SOCIAL NETWORK

## 2024-07-08 SDOH — HEALTH STABILITY: MENTAL HEALTH: SLEEP PATTERN: DIFFICULTY FALLING ASLEEP

## 2024-07-08 SDOH — HEALTH STABILITY: MENTAL HEALTH: WAS THIS WITHIN THE PAST THREE MONTHS?: NO

## 2024-07-08 SDOH — HEALTH STABILITY: MENTAL HEALTH: HAVE YOU BEEN THINKING ABOUT HOW YOU MIGHT DO THIS?: NO

## 2024-07-08 ASSESSMENT — COLUMBIA-SUICIDE SEVERITY RATING SCALE - C-SSRS
5. HAVE YOU STARTED TO WORK OUT OR WORKED OUT THE DETAILS OF HOW TO KILL YOURSELF? DO YOU INTEND TO CARRY OUT THIS PLAN?: YES
2. HAVE YOU ACTUALLY HAD ANY THOUGHTS OF KILLING YOURSELF?: YES
1. IN THE PAST MONTH, HAVE YOU WISHED YOU WERE DEAD OR WISHED YOU COULD GO TO SLEEP AND NOT WAKE UP?: YES
1. SINCE LAST CONTACT, HAVE YOU WISHED YOU WERE DEAD OR WISHED YOU COULD GO TO SLEEP AND NOT WAKE UP?: NO
6. HAVE YOU EVER DONE ANYTHING, STARTED TO DO ANYTHING, OR PREPARED TO DO ANYTHING TO END YOUR LIFE?: NO
4. HAVE YOU HAD THESE THOUGHTS AND HAD SOME INTENTION OF ACTING ON THEM?: NO
2. HAVE YOU ACTUALLY HAD ANY THOUGHTS OF KILLING YOURSELF?: NO
6. HAVE YOU EVER DONE ANYTHING, STARTED TO DO ANYTHING, OR PREPARED TO DO ANYTHING TO END YOUR LIFE?: NO

## 2024-07-08 ASSESSMENT — PAIN DESCRIPTION - LOCATION
LOCATION: BACK
LOCATION: BACK

## 2024-07-08 ASSESSMENT — PAIN DESCRIPTION - ONSET: ONSET: ONGOING

## 2024-07-08 ASSESSMENT — PAIN SCALES - GENERAL
PAINLEVEL_OUTOF10: 10 - WORST POSSIBLE PAIN
PAINLEVEL_OUTOF10: 2
PAINLEVEL_OUTOF10: 7
PAINLEVEL_OUTOF10: 4
PAINLEVEL_OUTOF10: 10 - WORST POSSIBLE PAIN

## 2024-07-08 ASSESSMENT — PAIN - FUNCTIONAL ASSESSMENT
PAIN_FUNCTIONAL_ASSESSMENT: 0-10

## 2024-07-08 ASSESSMENT — PAIN DESCRIPTION - PAIN TYPE: TYPE: CHRONIC PAIN

## 2024-07-08 ASSESSMENT — PAIN DESCRIPTION - FREQUENCY: FREQUENCY: CONSTANT/CONTINUOUS

## 2024-07-08 ASSESSMENT — PAIN DESCRIPTION - DESCRIPTORS: DESCRIPTORS: ACHING;PINS AND NEEDLES;TINGLING

## 2024-07-08 ASSESSMENT — PAIN DESCRIPTION - PROGRESSION: CLINICAL_PROGRESSION: GRADUALLY IMPROVING

## 2024-07-08 NOTE — ED PROVIDER NOTES
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  Patient states they have reduced his oxycodone from 5 mg every 6 to 5 mg every 12 hours and his pain has been so severe that he has had passive suicidal ideation when his pain becomes severe.  Of note patient has a prior ED visit with EPAT consult on 5/20/2024 for the same.  Did not meet inpatient criteria for that time.    Nursing Notes were reviewed.    PAST MEDICAL HISTORY     Past Medical History:   Diagnosis Date    Anxiety     Cervical disc disorder     Constipation     Depression     Diverticulitis     Hyperlipidemia     Hypertension     Muscle weakness     Nonrheumatic mitral (valve) prolapse     Pacemaker     Parkinson's disease (Multi)     Perforation of intestine (nontraumatic) (Multi)     Peritoneal adhesion     Polyneuropathy     Psychoactive substance abuse (Multi)     Sick sinus syndrome (Multi)     Syncope     TIA (transient ischemic attack)          SURGICAL HISTORY     History reviewed. No pertinent surgical history.      CURRENT MEDICATIONS       Previous Medications    ACETAMINOPHEN (TYLENOL 8 HOUR) 650 MG ER TABLET    Take 1 tablet (650 mg) by mouth every 6 hours if needed for mild pain (1 - 3). Do not crush, chew, or split.    ACETAMINOPHEN (TYLENOL) 325 MG TABLET    Take 2 tablets (650 mg) by " mouth every 6 hours if needed for fever (temp greater than 38.0 C) (pain or fever).    ALUM-MAG HYDROXIDE-SIMETH (MAALOX MAX) 400-400-40 MG/5 ML SUSPENSION    Take 30 mL by mouth every 12 hours if needed for indigestion or heartburn.    ATORVASTATIN (LIPITOR) 80 MG TABLET    Take 1 tablet (80 mg) by mouth once daily at bedtime.    BISACODYL (DULCOLAX, BISACODYL,) 10 MG SUPPOSITORY    Insert 1 suppository (10 mg) into the rectum once daily as needed for constipation.    BUPROPION (WELLBUTRIN) 75 MG TABLET    Take 1 tablet (75 mg) by mouth once daily.    CHOLECALCIFEROL (VITAMIN D-3) 50 MCG (2000 UT) TABLET    Take 1 tablet (50 mcg) by mouth once daily.    DICLOFENAC SODIUM (VOLTAREN) 1 % GEL    Apply 4.5 inches (4 g) topically 2 times a day as needed. To neck    DOCUSATE SODIUM (COLACE) 100 MG CAPSULE    Take 1 capsule (100 mg) by mouth every 12 hours if needed for constipation.    DULOXETINE (CYMBALTA) 30 MG DR CAPSULE    Take 3 capsules (90 mg) by mouth once daily.    FLUTICASONE (FLONASE) 50 MCG/ACTUATION NASAL SPRAY    Administer 1 spray into each nostril once daily.    GABAPENTIN (NEURONTIN) 800 MG TABLET    Take 1 tablet (800 mg) by mouth every 8 hours.    GUAIFENESIN (ROBITUSSIN) 100 MG/5 ML SYRUP    Take 10 mL (200 mg) by mouth every 4 hours if needed for cough or congestion.    LIDOCAINE (LIDODERM) 5 % PATCH    Place 1 patch on the skin once daily. To back of neck. Remove & discard patch within 12 hours or as directed by MD.    LIDOCAINE 4 % PATCH    Place 1 patch over 12 hours on the skin once daily. Remove & discard patch within 12 hours or as directed by MD.    LOPERAMIDE (IMODIUM A-D) 2 MG TABLET    Take 1 tablet (2 mg) by mouth every 12 hours if needed for diarrhea.    MAGNESIUM HYDROXIDE (MILK OF MAGNESIA) 400 MG/5 ML SUSPENSION    Take 30 mL by mouth once daily as needed for constipation.    MELATONIN 3 MG TABLET    Take 1 tablet (3 mg) by mouth once daily at bedtime.    METHOCARBAMOL (ROBAXIN) 750 MG  TABLET    Take 1 tablet (750 mg) by mouth 3 times a day.    METOPROLOL SUCCINATE XL (KAPSPARGO SPRINKLE) 100 MG 24 HR CAPSULE    Take 1 capsule (100 mg) by mouth once daily.    OXYCODONE (ROXICODONE) 5 MG IMMEDIATE RELEASE TABLET    Take 1 tablet (5 mg) by mouth every 4 hours if needed (pain).    PSYLLIUM (METAMUCIL) 0.4 GRAM CAPSULE    Take 5 capsules by mouth every 12 hours if needed (constipation).    SENNA 8.6 MG TABLET    Take 1 tablet (8.6 mg) by mouth once daily at bedtime.    SUCRALFATE (CARAFATE) 1 GRAM TABLET    Take 1 tablet (1 g) by mouth 4 times a day before meals.    VENLAFAXINE (EFFEXOR) 25 MG TABLET    Take 0.5 tablets (12.5 mg) by mouth once daily.       ALLERGIES     Patient has no known allergies.    FAMILY HISTORY     No family history on file.       SOCIAL HISTORY       Social History     Socioeconomic History    Marital status:      Spouse name: None    Number of children: None    Years of education: None    Highest education level: None   Occupational History    None   Tobacco Use    Smoking status: Every Day     Current packs/day: 0.50     Average packs/day: 0.5 packs/day for 50.5 years (25.3 ttl pk-yrs)     Types: Cigarettes     Start date: 1974    Smokeless tobacco: Never   Vaping Use    Vaping status: Never Used   Substance and Sexual Activity    Alcohol use: Never    Drug use: Never    Sexual activity: None   Other Topics Concern    None   Social History Narrative    None     Social Determinants of Health     Financial Resource Strain: Patient Declined (6/26/2024)    Overall Financial Resource Strain (CARDIA)     Difficulty of Paying Living Expenses: Patient declined   Food Insecurity: No Food Insecurity (4/3/2024)    Hunger Vital Sign     Worried About Running Out of Food in the Last Year: Never true     Ran Out of Food in the Last Year: Never true   Transportation Needs: No Transportation Needs (6/26/2024)    PRAPARE - Transportation     Lack of Transportation (Medical): No      Lack of Transportation (Non-Medical): No   Physical Activity: Not on file   Stress: Not on file   Social Connections: Not on file   Intimate Partner Violence: Not on file   Housing Stability: Low Risk  (6/26/2024)    Housing Stability Vital Sign     Unable to Pay for Housing in the Last Year: No     Number of Places Lived in the Last Year: 1     Unstable Housing in the Last Year: No       SCREENINGS                        PHYSICAL EXAM    (up to 7 for level 4, 8 or more for level 5)     ED Triage Vitals [07/08/24 0202]   Temperature Heart Rate Respirations BP   36.6 °C (97.9 °F) 67 18 (!) 141/99      Pulse Ox Temp src Heart Rate Source Patient Position   99 % -- -- --      BP Location FiO2 (%)     -- --       Physical Exam  Vitals and nursing note reviewed.   Constitutional:       General: He is not in acute distress.     Appearance: Normal appearance. He is not ill-appearing, toxic-appearing or diaphoretic.   HENT:      Head: Normocephalic and atraumatic.      Right Ear: External ear normal.      Left Ear: External ear normal.      Nose: Nose normal.      Mouth/Throat:      Pharynx: Oropharynx is clear.   Eyes:      Conjunctiva/sclera: Conjunctivae normal.   Neck:      Comments: Patient does have sharp shooting pain on range of motion of his cervical spine which she states is not new and is from his cervical stenosis.  No midline spinal tenderness, step-offs, deformities or palpable crepitus.  Cardiovascular:      Rate and Rhythm: Normal rate and regular rhythm.      Pulses: Normal pulses.      Heart sounds: Normal heart sounds.   Pulmonary:      Effort: Pulmonary effort is normal.      Breath sounds: Normal breath sounds.   Abdominal:      General: Abdomen is flat. There is no distension.      Palpations: Abdomen is soft. There is no mass.      Tenderness: There is no abdominal tenderness. There is no guarding or rebound.      Hernia: No hernia is present.   Musculoskeletal:         General: No swelling,  tenderness, deformity or signs of injury. Normal range of motion.      Cervical back: Normal range of motion and neck supple. No rigidity or tenderness.      Right lower leg: No edema.      Left lower leg: No edema.   Lymphadenopathy:      Cervical: No cervical adenopathy.   Skin:     General: Skin is warm and dry.   Neurological:      General: No focal deficit present.      Mental Status: He is alert and oriented to person, place, and time.   Psychiatric:      Comments: Reports passive HI via cutting but none currently.          DIAGNOSTIC RESULTS     LABS:  Labs Reviewed   CBC WITH AUTO DIFFERENTIAL - Abnormal       Result Value    WBC 10.3      nRBC 0.0      RBC 4.07 (*)     Hemoglobin 12.0 (*)     Hematocrit 37.5 (*)     MCV 92      MCH 29.5      MCHC 32.0      RDW 16.7 (*)     Platelets 160      Neutrophils % 43.2      Immature Granulocytes %, Automated 0.2      Lymphocytes % 33.7      Monocytes % 17.7      Eosinophils % 4.4      Basophils % 0.8      Neutrophils Absolute 4.48      Immature Granulocytes Absolute, Automated 0.02      Lymphocytes Absolute 3.48 (*)     Monocytes Absolute 1.83 (*)     Eosinophils Absolute 0.45 (*)     Basophils Absolute 0.08     COMPREHENSIVE METABOLIC PANEL - Abnormal    Glucose 73 (*)     Sodium 138      Potassium 3.9      Chloride 108 (*)     Bicarbonate 23      Anion Gap 11      Urea Nitrogen 15      Creatinine 1.03      eGFR 78      Calcium 9.3      Albumin 4.1      Alkaline Phosphatase 64      Total Protein 6.4      AST 16      Bilirubin, Total 0.3      ALT 15     ACUTE TOXICOLOGY PANEL, BLOOD - Normal    Acetaminophen <10.0      Salicylate  <3      Alcohol <10     DRUG SCREEN,URINE       All other labs were within normal range or not returned as of this dictation.    Imaging  No orders to display        Procedures  Procedures     EMERGENCY DEPARTMENT COURSE/MDM:     ED Course as of 07/08/24 0835   Mon Jul 08, 2024   0352 Patient's blood sugar slightly low at 73.  Patient will  be given juice.  Labs otherwise unremarkable for acute pathology however UDS still pending.  Medically cleared for evaluation by EPAT. 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  Hemodynamically stable, no acute distress, nontoxic-appearing, afebrile.  Patient reporting passive SI.  Labs and EKG ordered for clearance for evaluation by EPAT.  Patient was also given a 5 mg oxycodone tablet for pain. Patient's blood sugar slightly low at 73.  Patient will be given juice.  Labs otherwise unremarkable for acute pathology however UDS still pending.  Medically cleared for evaluation by EPAT.    Patient signed out to morning resident Dr. Gallardo pending EPAT evaluation.    Patient and or family in agreement and understanding of treatment plan.  All questions answered.      I reviewed the case with the attending ED physician. The attending ED physician agrees with the plan. Patient and/or patient´s representative was counseled regarding labs, imaging, likely diagnosis, and plan. All questions were answered.    ED Medications administered this visit:    Medications   oxyCODONE (Roxicodone) immediate release tablet 5 mg (5 mg oral Given 7/8/24 0222)       New Prescriptions from this visit:    New Prescriptions    No medications on file       Follow-up:  No follow-up provider specified.      Final Impression:   1. Suicidal ideation          (Please note that portions of this note were completed with a voice recognition program.  Efforts were made to edit the dictations but occasionally words are mis-transcribed.)     Bryant Dasilva DO  Resident  07/08/24 0837

## 2024-07-08 NOTE — PROGRESS NOTES
"Emergency Medicine Transition of Care Note.    I received Wilian Murry in signout from Dr. Dasilva.  Please see the previous ED provider note for all HPI, PE and MDM up to the time of signout at 0800. This is in addition to the primary record.    In brief Wilian Murry is an 73 y.o. male presenting for suicidal ideation  Chief Complaint   Patient presents with    Suicidal     SI/HI; \"they took my butter knives, i had three serrated knives and i was going to cut my arms across\" + HI \"only if they got in my way while i was trying to kill myself\"     At the time of signout we were awaiting: EPAT evaluation    MDM:  Patient is currently suicidal and has a plan.  Patient was planning on cutting himself.  He is suicidal secondary to having withdrawal from his opiates.  Given that patient is adamant that he will kill himself EPAT evaluation need to be completed.    After discussion with the EPAT I agree with their assessment that patient needs to be placed.  He is medically cleared for EPAT placement.  Pending placement at this time care transition to Dr. Casper.  In the meantime he has received a couple doses of oxy for pain control to help keep the patient calm while in the emergency department.  ED Course as of 07/08/24 1835 Mon Jul 08, 2024   0352 Patient's blood sugar slightly low at 73.  Patient will be given juice.  Labs otherwise unremarkable for acute pathology however UDS still pending.  Medically cleared for evaluation by EPAT. [CH]      ED Course User Index  [CH] Bryant Dasilva DO         Diagnoses as of 07/08/24 1835   Suicidal ideation       Tamika Gallardo DO   "

## 2024-07-08 NOTE — CONSULTS
Referring Provider  Bryant Dasilva DO    History Of Present Illness  Wilian Murry is a 73 y.o. male presenting with suicide attempt.      Pt seen via telemedicine in live time with auditory & visual abilities. Pt verified their name &  & consented to interview.    Patient reports he attempted suicide at [facility] due to intractable cervical pain.  Reports his oxycodone dose was being tapered, and does not have needed cervical spinal fusion scheduled in the near future. Faced with increasing pain, he states he preferred to die. Admits to gathering implements in his room to kill self, including knives from his dinner tray. Continues to say that he would prefer to die rather than experience his pain.     I spoke with Yany, unit manager at [facility] [phone] who reports last evening patient complained of increasing pain, stated he would kill self, had knife in his hand and was getting ready to cut self.  Staff took knife from his grasp. Also found total of 3 knifes he had hid to kill self.    Patient had similar presentation to [facility] May 2024, but did not sequester weapon or make attempt.       Past Medical History  He has a past medical history of Anxiety, Cervical disc disorder, Constipation, Depression, Diverticulitis, Hyperlipidemia, Hypertension, Muscle weakness, Nonrheumatic mitral (valve) prolapse, Pacemaker, Parkinson's disease (Multi), Perforation of intestine (nontraumatic) (Multi), Peritoneal adhesion, Polyneuropathy, Psychoactive substance abuse (Multi), Sick sinus syndrome (Multi), Syncope, and TIA (transient ischemic attack).    Surgical History  He has no past surgical history on file.     Social History  He reports that he has been smoking cigarettes. He started smoking about 50 years ago. He has a 25.3 pack-year smoking history. He has never used smokeless tobacco. He reports that he does not drink alcohol and does not use drugs.     Allergies  Patient has no known  "allergies.    Review of Systems    Psychiatric ROS - Adult  Anxiety: General Anxiety Disorder (SHANE)SHANE Behaviors: difficult to control worry, excessive anxiety/worry, and irritability  Depression: appetite decreased, energy, helpless, hopeless, persistent thoughts of death, and suicidal thoughts  Delirium: negative  Psychosis: negative  Penelope: negative  Safety Issues: suicidal ideation  Psychiatric ROS Comment:       Mental Status Exam  Mental Status Examination  General Appearance: Disheveled.  Gait[place]Station:  [place]  Speech: Emphatic speech, normal rate and prosody   Mood: irritable  Affect: Irritable and Angry  Thought Process: Linear, goal directed  Thought Associations: No loosening of associations  Thought Content: suicidal  Perception: No perceptual abnormalities noted  Level of Consciousness: Alert  Orientation: Alert  Attention and Concentration: Intact  Recent Memory: Intact as evidenced by ability to recall details from the past 24 hours   Remote Memory: Intact as evidenced by ability to recall previous medical issues   Executive function: Intact  Language: Naming intact  Fund of knowledge: Good  Insight:  poor  Judgment: Limited, as evidence by desire to cause harm to self      Psychiatric Risk Assessment  Violence Risk Assessment: acces to weapons, male, and unemployment  Acute Risk of Harm to Others is Considered: low   Suicide Risk Assessment: access to weapons, age > 65 yrs old, , chronic medical illness, chronic pain, current psychiatric illness, feelings of hopelessness, life crisis (shame/despair), living alone or lack of social support, male, and recent suicide attempt  Protective Factors against Suicide: none  Acute Risk of Harm to Self is Considered: high    Last Recorded Vitals  Blood pressure 100/82, pulse 93, temperature 36.9 °C (98.4 °F), temperature source Temporal, resp. rate 18, height 1.753 m (5' 9\"), weight 77.7 kg (171 lb 3.2 oz), SpO2 98%.    Relevant " Results      Scheduled medications    Continuous medications    PRN medications    Results for orders placed or performed during the hospital encounter of 07/08/24 (from the past 24 hour(s))   Electrocardiogram, 12-lead   Result Value Ref Range    Ventricular Rate 90 BPM    Atrial Rate 92 BPM    OR Interval 240 ms    QRS Duration 100 ms    QT Interval 380 ms    QTC Calculation(Bazett) 464 ms    P Axis 49 degrees    R Axis 59 degrees    T Axis 43 degrees    QRS Count 15 beats    Q Onset 224 ms    P Onset 104 ms    P Offset 138 ms    T Offset 414 ms    QTC Fredericia 435 ms   CBC and Auto Differential   Result Value Ref Range    WBC 10.3 4.4 - 11.3 x10*3/uL    nRBC 0.0 0.0 - 0.0 /100 WBCs    RBC 4.07 (L) 4.50 - 5.90 x10*6/uL    Hemoglobin 12.0 (L) 13.5 - 17.5 g/dL    Hematocrit 37.5 (L) 41.0 - 52.0 %    MCV 92 80 - 100 fL    MCH 29.5 26.0 - 34.0 pg    MCHC 32.0 32.0 - 36.0 g/dL    RDW 16.7 (H) 11.5 - 14.5 %    Platelets 160 150 - 450 x10*3/uL    Neutrophils % 43.2 40.0 - 80.0 %    Immature Granulocytes %, Automated 0.2 0.0 - 0.9 %    Lymphocytes % 33.7 13.0 - 44.0 %    Monocytes % 17.7 2.0 - 10.0 %    Eosinophils % 4.4 0.0 - 6.0 %    Basophils % 0.8 0.0 - 2.0 %    Neutrophils Absolute 4.48 1.60 - 5.50 x10*3/uL    Immature Granulocytes Absolute, Automated 0.02 0.00 - 0.50 x10*3/uL    Lymphocytes Absolute 3.48 (H) 0.80 - 3.00 x10*3/uL    Monocytes Absolute 1.83 (H) 0.05 - 0.80 x10*3/uL    Eosinophils Absolute 0.45 (H) 0.00 - 0.40 x10*3/uL    Basophils Absolute 0.08 0.00 - 0.10 x10*3/uL   Comprehensive Metabolic Panel   Result Value Ref Range    Glucose 73 (L) 74 - 99 mg/dL    Sodium 138 136 - 145 mmol/L    Potassium 3.9 3.5 - 5.3 mmol/L    Chloride 108 (H) 98 - 107 mmol/L    Bicarbonate 23 21 - 32 mmol/L    Anion Gap 11 10 - 20 mmol/L    Urea Nitrogen 15 6 - 23 mg/dL    Creatinine 1.03 0.50 - 1.30 mg/dL    eGFR 78 >60 mL/min/1.73m*2    Calcium 9.3 8.6 - 10.3 mg/dL    Albumin 4.1 3.4 - 5.0 g/dL    Alkaline Phosphatase 64  33 - 136 U/L    Total Protein 6.4 6.4 - 8.2 g/dL    AST 16 9 - 39 U/L    Bilirubin, Total 0.3 0.0 - 1.2 mg/dL    ALT 15 10 - 52 U/L   Acute Toxicology Panel, Blood   Result Value Ref Range    Acetaminophen <10.0 10.0 - 30.0 ug/mL    Salicylate  <3 4 - 20 mg/dL    Alcohol <10 <=10 mg/dL           Assessment/Plan   72 yo male with HX of depression, anxiety, cervical neuropathy presents after suicide attempt at [facility].  In the face of oxycodone taper, he reports increased pain.  He sequestered multiple knives from his dinner tray, and was in process of beginning to harm self when staff intervened and took the knife from him. Continues to assert that he would rather die than continue to experience pain.    IMP:  Adjustment Disorder with Disturbance of Mood and Conduct  HX of Depression and Anxiety    REC:  Due to elevated suicide risk, inpatient psychiatry disposition is warranted.  Patient declines voluntary admission.  Meets criteria for emergency admission per OR 5122.01 since he has made overt attempt at self harm  EPAT SW to locate disposition.       I spent 65 minutes in the professional and overall care of this patient.      Medication Consent  Medication Consent: n/a; consult service    Shubham Crenshaw MD

## 2024-07-08 NOTE — PROGRESS NOTES

## 2024-07-09 VITALS
BODY MASS INDEX: 25.36 KG/M2 | OXYGEN SATURATION: 99 % | HEART RATE: 94 BPM | WEIGHT: 171.2 LBS | TEMPERATURE: 97.9 F | RESPIRATION RATE: 18 BRPM | HEIGHT: 69 IN | DIASTOLIC BLOOD PRESSURE: 87 MMHG | SYSTOLIC BLOOD PRESSURE: 137 MMHG

## 2024-07-09 PROCEDURE — 2500000005 HC RX 250 GENERAL PHARMACY W/O HCPCS: Performed by: EMERGENCY MEDICINE

## 2024-07-09 PROCEDURE — 96374 THER/PROPH/DIAG INJ IV PUSH: CPT

## 2024-07-09 PROCEDURE — 2500000004 HC RX 250 GENERAL PHARMACY W/ HCPCS (ALT 636 FOR OP/ED): Performed by: EMERGENCY MEDICINE

## 2024-07-09 PROCEDURE — 2500000001 HC RX 250 WO HCPCS SELF ADMINISTERED DRUGS (ALT 637 FOR MEDICARE OP): Performed by: EMERGENCY MEDICINE

## 2024-07-09 PROCEDURE — 2500000001 HC RX 250 WO HCPCS SELF ADMINISTERED DRUGS (ALT 637 FOR MEDICARE OP)

## 2024-07-09 RX ORDER — DICLOFENAC SODIUM 10 MG/G
4 GEL TOPICAL 2 TIMES DAILY PRN
Status: DISCONTINUED | OUTPATIENT
Start: 2024-07-09 | End: 2024-07-09 | Stop reason: HOSPADM

## 2024-07-09 RX ORDER — VENLAFAXINE 25 MG/1
25 TABLET ORAL DAILY
Status: DISCONTINUED | OUTPATIENT
Start: 2024-07-09 | End: 2024-07-09 | Stop reason: HOSPADM

## 2024-07-09 RX ORDER — ACETAMINOPHEN 325 MG/1
650 TABLET ORAL EVERY 6 HOURS PRN
Status: DISCONTINUED | OUTPATIENT
Start: 2024-07-09 | End: 2024-07-09 | Stop reason: HOSPADM

## 2024-07-09 RX ORDER — METHOCARBAMOL 750 MG/1
750 TABLET, FILM COATED ORAL 3 TIMES DAILY
Status: DISCONTINUED | OUTPATIENT
Start: 2024-07-09 | End: 2024-07-09 | Stop reason: HOSPADM

## 2024-07-09 RX ORDER — KETOROLAC TROMETHAMINE 15 MG/ML
15 INJECTION, SOLUTION INTRAMUSCULAR; INTRAVENOUS ONCE
Status: COMPLETED | OUTPATIENT
Start: 2024-07-09 | End: 2024-07-09

## 2024-07-09 RX ORDER — TALC
3 POWDER (GRAM) TOPICAL NIGHTLY
Status: DISCONTINUED | OUTPATIENT
Start: 2024-07-09 | End: 2024-07-09 | Stop reason: HOSPADM

## 2024-07-09 RX ORDER — SENNOSIDES 8.6 MG/1
1 TABLET ORAL NIGHTLY
Status: DISCONTINUED | OUTPATIENT
Start: 2024-07-09 | End: 2024-07-09 | Stop reason: HOSPADM

## 2024-07-09 RX ORDER — ATORVASTATIN CALCIUM 80 MG/1
80 TABLET, FILM COATED ORAL NIGHTLY
Status: DISCONTINUED | OUTPATIENT
Start: 2024-07-09 | End: 2024-07-09 | Stop reason: HOSPADM

## 2024-07-09 RX ORDER — OXYCODONE HYDROCHLORIDE 5 MG/1
5 TABLET ORAL EVERY 12 HOURS PRN
Status: DISCONTINUED | OUTPATIENT
Start: 2024-07-09 | End: 2024-07-09 | Stop reason: HOSPADM

## 2024-07-09 RX ORDER — LIDOCAINE 560 MG/1
1 PATCH PERCUTANEOUS; TOPICAL; TRANSDERMAL DAILY
Status: DISCONTINUED | OUTPATIENT
Start: 2024-07-09 | End: 2024-07-09 | Stop reason: HOSPADM

## 2024-07-09 RX ORDER — ACETAMINOPHEN 325 MG/1
975 TABLET ORAL ONCE
Status: COMPLETED | OUTPATIENT
Start: 2024-07-09 | End: 2024-07-09

## 2024-07-09 RX ORDER — OXYCODONE HYDROCHLORIDE 5 MG/1
5 TABLET ORAL ONCE
Status: COMPLETED | OUTPATIENT
Start: 2024-07-09 | End: 2024-07-09

## 2024-07-09 RX ORDER — SUCRALFATE 1 G/1
1 TABLET ORAL
Status: DISCONTINUED | OUTPATIENT
Start: 2024-07-09 | End: 2024-07-09 | Stop reason: HOSPADM

## 2024-07-09 SDOH — HEALTH STABILITY: MENTAL HEALTH: SLEEP PATTERN: RESTLESSNESS

## 2024-07-09 SDOH — HEALTH STABILITY: MENTAL HEALTH: SUICIDE ASSESSMENT: ADULT (C-SSRS)

## 2024-07-09 SDOH — HEALTH STABILITY: MENTAL HEALTH

## 2024-07-09 SDOH — HEALTH STABILITY: MENTAL HEALTH: HAVE YOU ACTUALLY HAD ANY THOUGHTS OF KILLING YOURSELF?: NO

## 2024-07-09 SDOH — HEALTH STABILITY: MENTAL HEALTH: HAVE YOU WISHED YOU WERE DEAD OR WISHED YOU COULD GO TO SLEEP AND NOT WAKE UP?: NO

## 2024-07-09 SDOH — HEALTH STABILITY: MENTAL HEALTH: CONTENT: UNREMARKABLE

## 2024-07-09 SDOH — HEALTH STABILITY: MENTAL HEALTH: HAVE YOU EVER DONE ANYTHING, STARTED TO DO ANYTHING, OR PREPARED TO DO ANYTHING TO END YOUR LIFE?: NO

## 2024-07-09 SDOH — HEALTH STABILITY: MENTAL HEALTH: HAVE YOU HAD THESE THOUGHTS AND HAD SOME INTENTION OF ACTING ON THEM?: NO

## 2024-07-09 SDOH — HEALTH STABILITY: MENTAL HEALTH: HAVE YOU BEEN THINKING ABOUT HOW YOU MIGHT DO THIS?: NO

## 2024-07-09 SDOH — HEALTH STABILITY: MENTAL HEALTH: WAS THIS WITHIN THE PAST THREE MONTHS?: NO

## 2024-07-09 SDOH — HEALTH STABILITY: MENTAL HEALTH: RISK OF SUICIDE: NO RISK

## 2024-07-09 ASSESSMENT — PAIN SCALES - GENERAL
PAINLEVEL_OUTOF10: 10 - WORST POSSIBLE PAIN
PAINLEVEL_OUTOF10: 6
PAINLEVEL_OUTOF10: 10 - WORST POSSIBLE PAIN
PAINLEVEL_OUTOF10: 4
PAINLEVEL_OUTOF10: 8
PAINLEVEL_OUTOF10: 10 - WORST POSSIBLE PAIN

## 2024-07-09 ASSESSMENT — PAIN - FUNCTIONAL ASSESSMENT: PAIN_FUNCTIONAL_ASSESSMENT: 0-10

## 2024-07-09 NOTE — SIGNIFICANT EVENT
Application for Emergency Admission      Ready for Transfer?  Is the patient medically cleared for transfer to inpatient psychiatry: Yes  Has the patient been accepted to an inpatient psychiatric hospital: Yes    Application for Emergency Admission  IN ACCORDANCE WITH [place]  The Chief Clinical Officer of: Dany Hanks 7/9/2024 .1:29 AM    Reason for Hospitalization  The undersigned has reason to believe that: Wilian Murry Is a mentally ill person subject to hospitalization by court order under division B Section 5122.01 of [place] Revised Code, i.e., this person:    1.Yes  Represents a substantial risk of physical harm to self as manifested by evidence of threats of, or attempts at, suicide or serious self-inflicted bodily harm    2.No Represents a substantial risk of physical harm to others as manifested by evidence of recent homicidal or other violent behavior, evidence of recent threats that place another in reasonable fear of violent behavior and serious physical harm, or other evidence of present dangerousness    3.No Represents a substantial and immediate risk of serious physical impairment or injury to self as manifested by  evidence that the person is unable to provide for and is not providing for the person's basic physical needs because of the person's mental illness and that appropriate provision for those needs cannot be made  immediately available in the community    4.Yes Would benefit from treatment in a hospital for his mental illness and is in need of such treatment as manifested by evidence of behavior that creates a grave and imminent risk to substantial rights of others or  himself.    5.Yes Would benefit from treatment as manifested by evidence of behavior that indicates all of the following:       (a) The person is unlikely to survive safely in the community without supervision, based on a clinical determination.       (b) The person has a history of lack of compliance with  treatment for mental illness and one of the following applies:      (i) At least twice within the thirty-six months prior to the filing of an affidavit seeking court-ordered treatment of the person under section 5122.111 of the Revised Code, the lack of compliance has been a significant factor in necessitating hospitalization in a hospital or receipt of services in a forensic or other mental health unit of a correctional facility, provided that the thirty-six-month period shall be extended by the length of any hospitalization or incarceration of the person that occurred within the thirty-six-month period.      (ii) Within the forty-eight months prior to the filing of an affidavit seeking court-ordered treatment of the person under section 5122.111 of the Revised Code, the lack of compliance resulted in one or more acts of serious violent behavior toward self or others or threats of, or attempts at, serious physical harm to self or others, provided that the forty-eight-month period shall be extended by the length of any hospitalization or incarceration of the person that occurred within the forty-eight-month period.      (c) The person, as a result of mental illness, is unlikely to voluntarily participate in necessary treatment.       (d) In view of the person's treatment history and current behavior, the person is in need of treatment in order to prevent a relapse or deterioration that would be likely to result in substantial risk of serious harm to the person or others.    (e) Represents a substantial risk of physical harm to self or others if allowed to remain at liberty pending examination.    Therefore, it is requested that said person be admitted to the above named facility.    STATEMENT OF BELIEF    Must be filled out by one of the following: a psychiatrist, licensed physician, licensed clinical psychologist, health or ,  or .  (Statement shall include the circumstances under  which the individual was taken into custody and the reason for the person's belief that hospitalization is necessary. The statement shall also include a reference to efforts made to secure the individual's property at his residence if he was taken into custody there. Every reasonable and appropriate effort should be made to take this person into custody in the least conspicuous manner possible.)    Suicidal Ideation, threats to kill self    Demar Casper,  7/9/2024     _____________________________________________________________   Place of Employment: [facility] [place]    STATEMENT OF OBSERVATION BY PSYCHIATRIST, LICENSED PHYSICIAN, OR LICENSED CLINICAL PSYCHOLOGIST, IF APPLICABLE    Place of Observation (e.g., [facility], general hospital, office, emergency facility)  (If applicable, please complete)    Demar Casper,  7/9/2024    _____________________________________________________________

## 2024-07-12 ENCOUNTER — TELEPHONE (OUTPATIENT)
Dept: CARDIOLOGY | Facility: HOSPITAL | Age: 74
End: 2024-07-12

## 2024-07-15 ENCOUNTER — APPOINTMENT (OUTPATIENT)
Dept: CARDIOLOGY | Facility: HOSPITAL | Age: 74
End: 2024-07-15
Payer: COMMERCIAL

## 2024-07-24 ENCOUNTER — APPOINTMENT (OUTPATIENT)
Dept: NEUROSURGERY | Facility: CLINIC | Age: 74
End: 2024-07-24
Payer: COMMERCIAL

## 2024-07-25 ENCOUNTER — HOSPITAL ENCOUNTER (OUTPATIENT)
Dept: CARDIOLOGY | Facility: HOSPITAL | Age: 74
Discharge: HOME | End: 2024-07-25
Payer: COMMERCIAL

## 2024-07-25 VITALS
OXYGEN SATURATION: 95 % | HEIGHT: 69 IN | HEART RATE: 71 BPM | DIASTOLIC BLOOD PRESSURE: 75 MMHG | WEIGHT: 171 LBS | SYSTOLIC BLOOD PRESSURE: 115 MMHG | TEMPERATURE: 97 F | RESPIRATION RATE: 16 BRPM | BODY MASS INDEX: 25.33 KG/M2

## 2024-07-25 DIAGNOSIS — I05.9 RHEUMATIC MITRAL VALVE DISEASE, UNSPECIFIED: ICD-10-CM

## 2024-07-25 DIAGNOSIS — I34.0 NONRHEUMATIC MITRAL VALVE REGURGITATION: ICD-10-CM

## 2024-07-25 PROCEDURE — 7100000010 HC PHASE TWO TIME - EACH INCREMENTAL 1 MINUTE

## 2024-07-25 PROCEDURE — 93320 DOPPLER ECHO COMPLETE: CPT

## 2024-07-25 PROCEDURE — 2500000004 HC RX 250 GENERAL PHARMACY W/ HCPCS (ALT 636 FOR OP/ED): Performed by: INTERNAL MEDICINE

## 2024-07-25 PROCEDURE — 2500000001 HC RX 250 WO HCPCS SELF ADMINISTERED DRUGS (ALT 637 FOR MEDICARE OP): Performed by: INTERNAL MEDICINE

## 2024-07-25 PROCEDURE — 99152 MOD SED SAME PHYS/QHP 5/>YRS: CPT | Performed by: INTERNAL MEDICINE

## 2024-07-25 PROCEDURE — 99152 MOD SED SAME PHYS/QHP 5/>YRS: CPT

## 2024-07-25 PROCEDURE — 7100000009 HC PHASE TWO TIME - INITIAL BASE CHARGE

## 2024-07-25 PROCEDURE — 93312 ECHO TRANSESOPHAGEAL: CPT | Performed by: INTERNAL MEDICINE

## 2024-07-25 RX ORDER — OXYCODONE HYDROCHLORIDE 5 MG/1
5 TABLET ORAL EVERY 8 HOURS PRN
COMMUNITY

## 2024-07-25 RX ORDER — FUROSEMIDE 40 MG/1
40 TABLET ORAL DAILY
COMMUNITY

## 2024-07-25 RX ORDER — FENTANYL CITRATE 50 UG/ML
INJECTION, SOLUTION INTRAMUSCULAR; INTRAVENOUS AS NEEDED
Status: DISCONTINUED | OUTPATIENT
Start: 2024-07-25 | End: 2024-07-26 | Stop reason: HOSPADM

## 2024-07-25 RX ORDER — MIDAZOLAM HYDROCHLORIDE 1 MG/ML
INJECTION, SOLUTION INTRAMUSCULAR; INTRAVENOUS AS NEEDED
Status: DISCONTINUED | OUTPATIENT
Start: 2024-07-25 | End: 2024-07-26 | Stop reason: HOSPADM

## 2024-07-25 ASSESSMENT — PAIN SCALES - GENERAL: PAINLEVEL_OUTOF10: 8

## 2024-07-25 ASSESSMENT — COLUMBIA-SUICIDE SEVERITY RATING SCALE - C-SSRS
1. IN THE PAST MONTH, HAVE YOU WISHED YOU WERE DEAD OR WISHED YOU COULD GO TO SLEEP AND NOT WAKE UP?: NO
2. HAVE YOU ACTUALLY HAD ANY THOUGHTS OF KILLING YOURSELF?: NO
6. HAVE YOU EVER DONE ANYTHING, STARTED TO DO ANYTHING, OR PREPARED TO DO ANYTHING TO END YOUR LIFE?: NO

## 2024-07-25 ASSESSMENT — PAIN - FUNCTIONAL ASSESSMENT: PAIN_FUNCTIONAL_ASSESSMENT: 0-10

## 2024-07-25 NOTE — DISCHARGE INSTRUCTIONS
Transesophageal Echocardiogram    Why is this procedure done?  An echocardiogram uses sound waves to make moving pictures of your heart. Transesophageal means this test is done from inside of your food pipe or esophagus. This test is also called a AMADEO, which stands for transesophageal echocardiogram. A AMADEO looks at how well your heart works. The doctor is trying to learn about any problems with your heart. The doctor may look at how the blood flows in the heart. The doctor also checks how well the heart pumps. A AMADEO gives a closer and clearer picture of the heart. Your doctor may be looking for problems like:  Weakening of the heart muscle  Heart attack  Problems with the heart that have been there since birth  Heart murmurs  Heart infection  Swollen area or a tear on a main blood vessel  Blood clots  A heart that is bigger than it should be  Problems with the heart valves  A tumor  Getting better pictures if your doctor is not able to see your heart well on a standard echo on your chest well  Doctors may use AMADEO if someone has had heart surgery, lung disease, or is very overweight. It may also be used during a cardiac cath and during open heart surgeries.    What will the results be?  The pictures are looked at by a heart doctor. The results may show if there are any problems with your heart.  What happens before the procedure?  Your doctor will take your history and do an exam. Talk to your doctor about:  All the drugs you are taking. Be sure to include all prescription and over-the-counter (OTC) drugs and herbal supplements. Tell the doctor about any drug allergy. Bring a list of drugs you take with you.  When you need to stop eating or drinking before your procedure.  If you have ulcers, a hiatal hernia, or problems breathing.  Do not drink beer, wine, and mixed drinks (alcohol) for a few days before the procedure because it may interfere with the sedative used.  You will not be allowed to drive right away after  the procedure. Ask a family member or a friend to drive you home.  What happens during the procedure?  In the procedure area, the staff will put an IV in your arm to give you fluids and drugs. You will be given a drug to make you sleepy. It will also help you stay pain free during the surgery.  The staff will attach ECG electrodes on your body to watch your heart rate. They will put a cuff on your arm to check your blood pressure. They will put a probe on your finger to check your oxygen levels during the procedure.  Your doctor will have you gargle a drug or will spray a drug in your throat to numb the area. Most of the time, your doctor will also give you additional medicine to make you sleepy.  In some cases, an anesthesia doctor will put you completely to sleep for the procedure.  You will be asked to lie on your left side. They may put a small tube in your nose to help you breathe. They will place a guard in your mouth to protect your teeth and also to keep your mouth open during the procedure.  The doctor will slide the probe down your throat and into your food pipe. You may feel the probe as it moves, but it should not hurt. You should be able to breathe with the probe in place. The doctor will take pictures of your heart from different angles. Then, the probe is taken out.  This procedure takes less than 60 minutes.  What happens after the procedure?  You will be moved to a Recovery Room after procedure. The hospital staff will watch your heart rate and breathing until you are stable.  Talk to your doctor about when the results will be available.  You may go home after the procedure.  What care is needed at home?  Ask your doctor what you need to do when you go home. Make sure you ask questions if you do not understand what the doctor says.  Your throat may feel sore.  Do not eat or drink anything until the numbness in your throat wears off so that you don't choke. Then you may drink fluids and eat soft  foods until your throat feels normal.  For the next day or so, get lots of rest. Sleep when you are feeling tired. Avoid doing tiring activities.  Do not drive. Avoid using tools or machines, such as a lawnmower, for at least 1 day after the procedure.  What follow-up care is needed?  Your doctor may ask you to make visits to the office to check on your progress. Be sure to keep these visits.  The results will help your doctor understand what kind of problem you have with your heart. Together you can make a plan for more care.  Your doctor may send you to a heart specialist if the test results show a problem.  What problems could happen?  Painful swallowing  Bleeding  Problems breathing  Heart rhythm problems  Upset stomach  Damage or tear to the esophagus (this is very rare)  Last Reviewed Date  2021-11-05  Consumer Information Use and Disclaimer  This generalized information is a limited summary of diagnosis, treatment, and/or medication information. It is not meant to be comprehensive and should be used as a tool to help the user understand and/or assess potential diagnostic and treatment options. It does NOT include all information about conditions, treatments, medications, side effects, or risks that may apply to a specific patient. It is not intended to be medical advice or a substitute for the medical advice, diagnosis, or treatment of a health care provider based on the health care provider's examination and assessment of a patient’s specific and unique circumstances. Patients must speak with a health care provider for complete information about their health, medical questions, and treatment options, including any risks or benefits regarding use of medications. This information does not endorse any treatments or medications as safe, effective, or approved for treating a specific patient. [employer] and its affiliates disclaim any warranty or liability relating to this information or the use thereof. The  use of this information is governed by the Terms of Use, available at https://www.wolterskluwer.com/en/know/clinical-effectiveness-terms  Copyright © 2023 [employer] and its affiliates and/or licensors. All rights

## 2024-07-26 ENCOUNTER — TELEPHONE (OUTPATIENT)
Dept: CARDIOLOGY | Facility: HOSPITAL | Age: 74
End: 2024-07-26
Payer: COMMERCIAL

## 2024-07-26 LAB — EJECTION FRACTION: 63 %

## 2024-07-26 NOTE — TELEPHONE ENCOUNTER
Please schedule patient office visit to review the results of his AMADEO and neck steps.  It can be virtual if patient can come in.  I tried to call him but got no answer at his facility.    SDH

## 2024-07-30 ENCOUNTER — APPOINTMENT (OUTPATIENT)
Dept: CARDIOLOGY | Facility: HOSPITAL | Age: 74
End: 2024-07-30
Payer: COMMERCIAL

## 2024-07-30 ENCOUNTER — HOSPITAL ENCOUNTER (EMERGENCY)
Facility: HOSPITAL | Age: 74
Discharge: HOME | End: 2024-07-30
Attending: EMERGENCY MEDICINE
Payer: COMMERCIAL

## 2024-07-30 ENCOUNTER — APPOINTMENT (OUTPATIENT)
Dept: RADIOLOGY | Facility: HOSPITAL | Age: 74
End: 2024-07-30
Payer: COMMERCIAL

## 2024-07-30 VITALS
OXYGEN SATURATION: 96 % | WEIGHT: 172 LBS | SYSTOLIC BLOOD PRESSURE: 107 MMHG | BODY MASS INDEX: 25.48 KG/M2 | TEMPERATURE: 97.3 F | HEIGHT: 69 IN | HEART RATE: 75 BPM | DIASTOLIC BLOOD PRESSURE: 70 MMHG | RESPIRATION RATE: 11 BRPM

## 2024-07-30 DIAGNOSIS — R42 LIGHTHEADEDNESS: Primary | ICD-10-CM

## 2024-07-30 LAB
ALBUMIN SERPL BCP-MCNC: 3.9 G/DL (ref 3.4–5)
ALP SERPL-CCNC: 64 U/L (ref 33–136)
ALT SERPL W P-5'-P-CCNC: 17 U/L (ref 10–52)
ANION GAP SERPL CALC-SCNC: 11 MMOL/L (ref 10–20)
AST SERPL W P-5'-P-CCNC: 22 U/L (ref 9–39)
ATRIAL RATE: 75 BPM
BASOPHILS # BLD AUTO: 0.06 X10*3/UL (ref 0–0.1)
BASOPHILS NFR BLD AUTO: 0.6 %
BILIRUB SERPL-MCNC: 0.2 MG/DL (ref 0–1.2)
BUN SERPL-MCNC: 21 MG/DL (ref 6–23)
CALCIUM SERPL-MCNC: 8.9 MG/DL (ref 8.6–10.3)
CARDIAC TROPONIN I PNL SERPL HS: 4 NG/L (ref 0–20)
CARDIAC TROPONIN I PNL SERPL HS: 4 NG/L (ref 0–20)
CHLORIDE SERPL-SCNC: 107 MMOL/L (ref 98–107)
CO2 SERPL-SCNC: 22 MMOL/L (ref 21–32)
CREAT SERPL-MCNC: 1.2 MG/DL (ref 0.5–1.3)
EGFRCR SERPLBLD CKD-EPI 2021: 65 ML/MIN/1.73M*2
EOSINOPHIL # BLD AUTO: 0.41 X10*3/UL (ref 0–0.4)
EOSINOPHIL NFR BLD AUTO: 4.4 %
ERYTHROCYTE [DISTWIDTH] IN BLOOD BY AUTOMATED COUNT: 16.8 % (ref 11.5–14.5)
GLUCOSE SERPL-MCNC: 80 MG/DL (ref 74–99)
HCT VFR BLD AUTO: 36 % (ref 41–52)
HGB BLD-MCNC: 11.4 G/DL (ref 13.5–17.5)
IMM GRANULOCYTES # BLD AUTO: 0.03 X10*3/UL (ref 0–0.5)
IMM GRANULOCYTES NFR BLD AUTO: 0.3 % (ref 0–0.9)
LYMPHOCYTES # BLD AUTO: 3.08 X10*3/UL (ref 0.8–3)
LYMPHOCYTES NFR BLD AUTO: 33 %
MCH RBC QN AUTO: 29.5 PG (ref 26–34)
MCHC RBC AUTO-ENTMCNC: 31.7 G/DL (ref 32–36)
MCV RBC AUTO: 93 FL (ref 80–100)
MONOCYTES # BLD AUTO: 1.63 X10*3/UL (ref 0.05–0.8)
MONOCYTES NFR BLD AUTO: 17.5 %
NEUTROPHILS # BLD AUTO: 4.11 X10*3/UL (ref 1.6–5.5)
NEUTROPHILS NFR BLD AUTO: 44.2 %
NRBC BLD-RTO: 0 /100 WBCS (ref 0–0)
P AXIS: -21 DEGREES
P OFFSET: 194 MS
P ONSET: 161 MS
PLATELET # BLD AUTO: 185 X10*3/UL (ref 150–450)
POTASSIUM SERPL-SCNC: 4.4 MMOL/L (ref 3.5–5.3)
PR INTERVAL: 198 MS
PROT SERPL-MCNC: 6.3 G/DL (ref 6.4–8.2)
Q ONSET: 209 MS
QRS COUNT: 12 BEATS
QRS DURATION: 104 MS
QT INTERVAL: 396 MS
QTC CALCULATION(BAZETT): 442 MS
QTC FREDERICIA: 426 MS
R AXIS: 61 DEGREES
RBC # BLD AUTO: 3.86 X10*6/UL (ref 4.5–5.9)
SODIUM SERPL-SCNC: 136 MMOL/L (ref 136–145)
T AXIS: 47 DEGREES
T OFFSET: 407 MS
VENTRICULAR RATE: 75 BPM
WBC # BLD AUTO: 9.3 X10*3/UL (ref 4.4–11.3)

## 2024-07-30 PROCEDURE — 99283 EMERGENCY DEPT VISIT LOW MDM: CPT | Mod: 25

## 2024-07-30 PROCEDURE — 84484 ASSAY OF TROPONIN QUANT: CPT

## 2024-07-30 PROCEDURE — 2500000001 HC RX 250 WO HCPCS SELF ADMINISTERED DRUGS (ALT 637 FOR MEDICARE OP)

## 2024-07-30 PROCEDURE — 93005 ELECTROCARDIOGRAM TRACING: CPT

## 2024-07-30 PROCEDURE — 85025 COMPLETE CBC W/AUTO DIFF WBC: CPT

## 2024-07-30 PROCEDURE — 36415 COLL VENOUS BLD VENIPUNCTURE: CPT

## 2024-07-30 PROCEDURE — 71045 X-RAY EXAM CHEST 1 VIEW: CPT | Mod: FOREIGN READ | Performed by: RADIOLOGY

## 2024-07-30 PROCEDURE — 71045 X-RAY EXAM CHEST 1 VIEW: CPT

## 2024-07-30 PROCEDURE — 84075 ASSAY ALKALINE PHOSPHATASE: CPT

## 2024-07-30 RX ORDER — OXYCODONE HYDROCHLORIDE 5 MG/1
5 TABLET ORAL ONCE
Status: COMPLETED | OUTPATIENT
Start: 2024-07-30 | End: 2024-07-30

## 2024-07-30 ASSESSMENT — LIFESTYLE VARIABLES
EVER FELT BAD OR GUILTY ABOUT YOUR DRINKING: NO
TOTAL SCORE: 0
HAVE YOU EVER FELT YOU SHOULD CUT DOWN ON YOUR DRINKING: NO
EVER HAD A DRINK FIRST THING IN THE MORNING TO STEADY YOUR NERVES TO GET RID OF A HANGOVER: NO
HAVE PEOPLE ANNOYED YOU BY CRITICIZING YOUR DRINKING: NO

## 2024-07-30 ASSESSMENT — PAIN SCALES - GENERAL
PAINLEVEL_OUTOF10: 6
PAINLEVEL_OUTOF10: 0 - NO PAIN
PAINLEVEL_OUTOF10: 5 - MODERATE PAIN
PAINLEVEL_OUTOF10: 0 - NO PAIN
PAINLEVEL_OUTOF10: 6
PAINLEVEL_OUTOF10: 6
PAINLEVEL_OUTOF10: 5 - MODERATE PAIN
PAINLEVEL_OUTOF10: 5 - MODERATE PAIN

## 2024-07-30 ASSESSMENT — PAIN - FUNCTIONAL ASSESSMENT: PAIN_FUNCTIONAL_ASSESSMENT: 0-10

## 2024-07-30 NOTE — ED PROVIDER NOTES
EMERGENCY DEPARTMENT ENCOUNTER      Pt Name: Wilian Murry  MRN: [id number]  Birthdate 1950  Date of evaluation: 7/30/2024  Provider: Martin Lou DO    CHIEF COMPLAINT       Chief Complaint   Patient presents with    Weakness, Gen     PT. ARRIVED VIA EMS TO ED FROM [facility] FOR GENERALIZED WEAKNESS. PT. STATES HE WAS IN HIS W/C AND FELT LIKE HE WAS GOING TO HAVE A NEAR SYNCOPAL EPISODE BUT DID NOT. PT. STATES WEAKNESS STARTED ROUGHLY AN HOUR PTA. PT. PUPILS EQUAL/REACTIVE, NO FACIAL DROOP, NO DRIFT, B/L UPPER/LOWER EXTREMITIES 4/5 STRENGTH (BASELINE FROM PRIOR TIA). PT. ON CARDIAC MONITOR READING PACED. PT. BREATHING UNLABORED, SPEAKING IN FULL SENTENCES, B/L LUNG SOUNDS CLEAR, PULSE OX READING 98% ON RA. PT. A&O X4, DENIES CP         HISTORY OF PRESENT ILLNESS    73-year-old male history of sick sinus syndrome with a pacer, comes to the emergency room after a presyncopal episode.  EMS reports that he has a history of presyncopal episodes, has a pacemaker as well, but does frequently have syncopal episodes when he does not get his oxycodone.  His oxycodone dose was missed tonight at the facility he is at.  He has no chest pain, shortness of breath currently, no abdominal pain or any other symptoms.          Nursing Notes were reviewed.    PAST MEDICAL HISTORY     Past Medical History:   Diagnosis Date    Anxiety     Cervical disc disorder     Constipation     Depression     Diverticulitis     Hyperlipidemia     Hypertension     Muscle weakness     Nonrheumatic mitral (valve) prolapse     Pacemaker     Parkinson's disease (Multi)     Perforation of intestine (nontraumatic) (Multi)     Peritoneal adhesion     Polyneuropathy     Psychoactive substance abuse (Multi)     Sick sinus syndrome (Multi)     Syncope     TIA (transient ischemic attack)          SURGICAL HISTORY     No past surgical history on file.      CURRENT MEDICATIONS       Previous Medications    ACETAMINOPHEN (TYLENOL 8 HOUR) 650 MG ER  TABLET    Take 1 tablet (650 mg) by mouth every 6 hours if needed for mild pain (1 - 3) or moderate pain (4 - 6). Do not crush, chew, or split.    ACETAMINOPHEN (TYLENOL) 325 MG TABLET    Take 2 tablets (650 mg) by mouth every 6 hours if needed for fever (temp greater than 38.0 C) (pain or fever).    ALUM-MAG HYDROXIDE-SIMETH (MAALOX MAX) 400-400-40 MG/5 ML SUSPENSION    Take 30 mL by mouth every 12 hours if needed for indigestion or heartburn.    ATORVASTATIN (LIPITOR) 80 MG TABLET    Take 1 tablet (80 mg) by mouth once daily at bedtime.    BISACODYL (DULCOLAX, BISACODYL,) 10 MG SUPPOSITORY    Insert 1 suppository (10 mg) into the rectum once daily as needed for constipation.    CHOLECALCIFEROL (VITAMIN D-3) 50 MCG (2000 UT) TABLET    Take 2 tablets (100 mcg) by mouth once daily.    DICLOFENAC SODIUM (VOLTAREN) 1 % GEL    Apply 4.5 inches (4 g) topically 2 times a day as needed (pain). To neck    DOCUSATE SODIUM (COLACE) 100 MG CAPSULE    Take 1 capsule (100 mg) by mouth every 12 hours if needed for constipation.    DULOXETINE (CYMBALTA) 20 MG DR CAPSULE    Take 1 capsule (20 mg) by mouth once daily as needed (anxiety). Do not crush or chew.    DULOXETINE (CYMBALTA) 60 MG DR CAPSULE    Take 1 capsule (60 mg) by mouth once daily as needed (anxiety).    DULOXETINE 40 MG DR CAPSULE    Take 1 capsule (40 mg) by mouth once daily as needed (anxiety). Do not fill before July 12, 2024.    FLUTICASONE (FLONASE) 50 MCG/ACTUATION NASAL SPRAY    Administer 1 spray into each nostril once daily.    FUROSEMIDE (LASIX) 40 MG TABLET    Take 1 tablet (40 mg) by mouth once daily. Take for 5 days    GABAPENTIN (NEURONTIN) 800 MG TABLET    Take 1 tablet (800 mg) by mouth every 8 hours.    GUAIFENESIN (ROBITUSSIN) 100 MG/5 ML SYRUP    Take 10 mL (200 mg) by mouth every 4 hours if needed for cough or congestion.    LIDOCAINE (LIDODERM) 5 % PATCH    Place 1 patch on the skin once daily. To back of neck. Remove & discard patch within 12  hours or as directed by MD.    LIDOCAINE 4 % PATCH    Place 1 patch over 12 hours on the skin once daily. Remove & discard patch within 12 hours or as directed by MD.    LOPERAMIDE (IMODIUM A-D) 2 MG TABLET    Take 1 tablet (2 mg) by mouth every 12 hours if needed for diarrhea.    MAGNESIUM HYDROXIDE (MILK OF MAGNESIA) 400 MG/5 ML SUSPENSION    Take 30 mL by mouth once daily as needed for constipation.    MELATONIN 3 MG TABLET    Take 1 tablet (3 mg) by mouth once daily at bedtime.    METHOCARBAMOL (ROBAXIN) 750 MG TABLET    Take 1 tablet (750 mg) by mouth 3 times a day.    METOPROLOL SUCCINATE XL (KAPSPARGO SPRINKLE) 100 MG 24 HR CAPSULE    Take 1 capsule (100 mg) by mouth once daily.    OXYCODONE (ROXICODONE) 5 MG IMMEDIATE RELEASE TABLET    Take 1 tablet (5 mg) by mouth every 8 hours if needed for severe pain (7 - 10).    PSYLLIUM (METAMUCIL) 0.4 GRAM CAPSULE    Take 5 capsules by mouth every 12 hours if needed (constipation).    SENNA 8.6 MG TABLET    Take 1 tablet (8.6 mg) by mouth once daily at bedtime.    SUCRALFATE (CARAFATE) 1 GRAM TABLET    Take 1 tablet (1 g) by mouth 4 times a day before meals.    VENLAFAXINE (EFFEXOR) 25 MG TABLET    Take 1 tablet (25 mg) by mouth once daily.    VENLAFAXINE (EFFEXOR) 37.5 MG TABLET    Take 1 tablet (37.5 mg) by mouth once daily. Do not fill before July 12, 2024.       ALLERGIES     Patient has no known allergies.    FAMILY HISTORY     No family history on file.       SOCIAL HISTORY       Social History     Socioeconomic History    Marital status:    Tobacco Use    Smoking status: Every Day     Current packs/day: 0.50     Average packs/day: 0.5 packs/day for 50.6 years (25.3 ttl pk-yrs)     Types: Cigarettes     Start date: 1974    Smokeless tobacco: Never   Vaping Use    Vaping status: Never Used   Substance and Sexual Activity    Alcohol use: Never    Drug use: Never     Social Determinants of Health     Financial Resource Strain: Patient Declined (6/26/2024)     Overall Financial Resource Strain (CARDIA)     Difficulty of Paying Living Expenses: Patient declined   Food Insecurity: No Food Insecurity (4/3/2024)    Hunger Vital Sign     Worried About Running Out of Food in the Last Year: Never true     Ran Out of Food in the Last Year: Never true   Transportation Needs: No Transportation Needs (6/26/2024)    PRAPARE - Transportation     Lack of Transportation (Medical): No     Lack of Transportation (Non-Medical): No   Housing Stability: Low Risk  (6/26/2024)    Housing Stability Vital Sign     Unable to Pay for Housing in the Last Year: No     Number of Places Lived in the Last Year: 1     Unstable Housing in the Last Year: No       SCREENINGS                        PHYSICAL EXAM    (up to 7 for level 4, 8 or more for level 5)     ED Triage Vitals [07/30/24 0313]   Temperature Heart Rate Respirations BP   36.3 °C (97.3 °F) 76 18 125/78      Pulse Ox Temp Source Heart Rate Source Patient Position   98 % Temporal Monitor Sitting      BP Location FiO2 (%)     Left arm --       Physical Exam  Vitals and nursing note reviewed.   Constitutional:       General: He is not in acute distress.  HENT:      Head: Normocephalic and atraumatic.   Eyes:      General: No scleral icterus.        Right eye: No discharge.         Left eye: No discharge.      Conjunctiva/sclera: Conjunctivae normal.   Cardiovascular:      Rate and Rhythm: Normal rate and regular rhythm.      Pulses: Normal pulses.   Pulmonary:      Effort: Pulmonary effort is normal.   Abdominal:      General: Abdomen is flat.      Palpations: Abdomen is soft.      Tenderness: There is no abdominal tenderness. There is no guarding or rebound.   Musculoskeletal:         General: No deformity.      Right lower leg: No edema.      Left lower leg: No edema.   Skin:     General: Skin is warm and dry.   Neurological:      Mental Status: He is alert and oriented to person, place, and time. Mental status is at baseline.    Psychiatric:         Mood and Affect: Mood normal.         Behavior: Behavior normal.          DIAGNOSTIC RESULTS     LABS:  Labs Reviewed   CBC WITH AUTO DIFFERENTIAL - Abnormal       Result Value    WBC 9.3      nRBC 0.0      RBC 3.86 (*)     Hemoglobin 11.4 (*)     Hematocrit 36.0 (*)     MCV 93      MCH 29.5      MCHC 31.7 (*)     RDW 16.8 (*)     Platelets 185      Neutrophils % 44.2      Immature Granulocytes %, Automated 0.3      Lymphocytes % 33.0      Monocytes % 17.5      Eosinophils % 4.4      Basophils % 0.6      Neutrophils Absolute 4.11      Immature Granulocytes Absolute, Automated 0.03      Lymphocytes Absolute 3.08 (*)     Monocytes Absolute 1.63 (*)     Eosinophils Absolute 0.41 (*)     Basophils Absolute 0.06     COMPREHENSIVE METABOLIC PANEL - Abnormal    Glucose 80      Sodium 136      Potassium 4.4      Chloride 107      Bicarbonate 22      Anion Gap 11      Urea Nitrogen 21      Creatinine 1.20      eGFR 65      Calcium 8.9      Albumin 3.9      Alkaline Phosphatase 64      Total Protein 6.3 (*)     AST 22      Bilirubin, Total 0.2      ALT 17     SERIAL TROPONIN-INITIAL - Normal    Troponin I, High Sensitivity 4      Narrative:     Less than 99th percentile of normal range cutoff-  Female and children under 18 years old <14 ng/L; Male <21 ng/L: Negative  Repeat testing should be performed if clinically indicated.     Female and children under 18 years old 14-50 ng/L; Male 21-50 ng/L:  Consistent with possible cardiac damage and possible increased clinical   risk. Serial measurements may help to assess extent of myocardial damage.     >50 ng/L: Consistent with cardiac damage, increased clinical risk and  myocardial infarction. Serial measurements may help assess extent of   myocardial damage.      NOTE: Children less than 1 year old may have higher baseline troponin   levels and results should be interpreted in conjunction with the overall   clinical context.     NOTE: Troponin I testing is  performed using a different   testing methodology at [facility] than at other   [facility]. Direct result comparisons should only   be made within the same method.   SERIAL TROPONIN, 1 HOUR - Normal    Troponin I, High Sensitivity 4      Narrative:     Less than 99th percentile of normal range cutoff-  Female and children under 18 years old <14 ng/L; Male <21 ng/L: Negative  Repeat testing should be performed if clinically indicated.     Female and children under 18 years old 14-50 ng/L; Male 21-50 ng/L:  Consistent with possible cardiac damage and possible increased clinical   risk. Serial measurements may help to assess extent of myocardial damage.     >50 ng/L: Consistent with cardiac damage, increased clinical risk and  myocardial infarction. Serial measurements may help assess extent of   myocardial damage.      NOTE: Children less than 1 year old may have higher baseline troponin   levels and results should be interpreted in conjunction with the overall   clinical context.     NOTE: Troponin I testing is performed using a different   testing methodology at [facility] than at other   [facility]. Direct result comparisons should only   be made within the same method.   TROPONIN SERIES- (INITIAL, 1 HR)    Narrative:     The following orders were created for panel order Troponin I Series, High Sensitivity (0, 1 HR).  Procedure                               Abnormality         Status                     ---------                               -----------         ------                     Troponin I, High Sensiti...[[id number]]  Normal              Final result               Troponin, High Sensitivi...[[id number]]  Normal              Final result                 Please view results for these tests on the individual orders.       All other labs were within normal range or not returned as of this dictation.    Imaging  XR chest 1 view   Final Result   No acute pulmonary  abnormality.   Signed by Sukhjinder Carranza MD      Cardiac device check - Inpatient    (Results Pending)        Procedures  Procedures     EMERGENCY DEPARTMENT COURSE/MDM:     ED Course as of 07/30/24 0521 Tue Jul 30, 2024 0311 Independent interpretation of EKG shows atrial paced rhythm 75 bpm, no acute injury pattern [RD]   0445 Repeat EKG at 4:45 AM shows in a paced rhythm at 75 bpm, QTc 442, no acute injury pattern. [RD]      ED Course User Index  [RD] Martin LouDO         Diagnoses as of 07/30/24 0521   Lightheadedness        Medical Decision Making  73-year-old male comes emergency room after being lightheaded.  Given symptomatology, clinical scenario, did order cardiac workup on the patient here today, did interrogate his pacemaker as well.  His pacemaker interrogation was clean and there was no concern for any arrhythmia that causes lightheadedness here today.  He does have chronic neck pain, takes oxycodone 5 mg every 8 hours, his night dose tonight was missed by the facility so I did give him one 5 mg dose here.  CBC and chemistry were unremarkable, troponins were negative x 2, EKGs showed atrially paced rhythm, no acute injury pattern.  X-ray showed no signs of pneumonia, pneumothorax.  On reassessment, patient said he felt better, was comfortable with being discharged home at this time.  He will return for any new, concerning or worsening symptoms.        Patient and or family in agreement and understanding of treatment plan.  All questions answered.      I reviewed the case with the attending ED physician. The attending ED physician agrees with the plan. Patient and/or patient´s representative was counseled regarding labs, imaging, likely diagnosis, and plan. All questions were answered.    ED Medications administered this visit:    Medications   oxyCODONE (Roxicodone) immediate release tablet 5 mg (5 mg oral Given 7/30/24 0334)       New Prescriptions from this visit:    New Prescriptions    No  medications on file       Follow-up:  Raquel Jordan MD  [address]  [facility] [address] [phone]    Schedule an appointment as soon as possible for a visit           Final Impression:   1. Lightheadedness          (Please note that portions of this note were completed with a voice recognition program.  Efforts were made to edit the dictations but occasionally words are mis-transcribed.)     Martin Lou,   Resident  07/30/24 7409

## 2024-07-30 NOTE — ED TRIAGE NOTES
PT. ARRIVED VIA EMS TO ED FROM [facility] FOR GENERALIZED WEAKNESS. PT. STATES HE WAS IN HIS W/C AND FELT LIKE HE WAS GOING TO HAVE A NEAR SYNCOPAL EPISODE BUT DID NOT. PT. STATES WEAKNESS STARTED ROUGHLY AN HOUR PTA. PT. PUPILS EQUAL/REACTIVE, NO FACIAL DROOP, NO DRIFT, B/L UPPER/LOWER EXTREMITIES 4/5 STRENGTH (BASELINE FROM PRIOR TIA). PT. ON CARDIAC MONITOR READING PACED. PT. BREATHING UNLABORED, SPEAKING IN FULL SENTENCES, B/L LUNG SOUNDS CLEAR, PULSE OX READING 98% ON RA. PT. A&O X4, DENIES CP, SOB, N/V/D, HA, NUMBNESS/TINGLING.

## 2024-07-30 NOTE — DISCHARGE INSTRUCTIONS
Follow-up with your primary care doctor, your cardiologist.  Return the emergency room any chest pain, shortness of breath, further syncopal episodes, or any other new concerning symptoms.

## 2024-08-01 ENCOUNTER — APPOINTMENT (OUTPATIENT)
Dept: CARDIOLOGY | Facility: CLINIC | Age: 74
End: 2024-08-01
Payer: COMMERCIAL

## 2024-08-01 VITALS
BODY MASS INDEX: 25.48 KG/M2 | HEART RATE: 87 BPM | DIASTOLIC BLOOD PRESSURE: 76 MMHG | HEIGHT: 69 IN | OXYGEN SATURATION: 93 % | WEIGHT: 172 LBS | SYSTOLIC BLOOD PRESSURE: 112 MMHG

## 2024-08-01 DIAGNOSIS — I34.0 NONRHEUMATIC MITRAL VALVE REGURGITATION: Primary | ICD-10-CM

## 2024-08-01 PROCEDURE — 3008F BODY MASS INDEX DOCD: CPT | Performed by: INTERNAL MEDICINE

## 2024-08-01 PROCEDURE — 1125F AMNT PAIN NOTED PAIN PRSNT: CPT | Performed by: INTERNAL MEDICINE

## 2024-08-01 PROCEDURE — 99214 OFFICE O/P EST MOD 30 MIN: CPT | Performed by: INTERNAL MEDICINE

## 2024-08-01 PROCEDURE — 1159F MED LIST DOCD IN RCRD: CPT | Performed by: INTERNAL MEDICINE

## 2024-08-01 PROCEDURE — 3074F SYST BP LT 130 MM HG: CPT | Performed by: INTERNAL MEDICINE

## 2024-08-01 PROCEDURE — 3078F DIAST BP <80 MM HG: CPT | Performed by: INTERNAL MEDICINE

## 2024-08-01 RX ORDER — PANTOPRAZOLE SODIUM 40 MG/1
40 TABLET, DELAYED RELEASE ORAL
COMMUNITY

## 2024-08-01 RX ORDER — BACLOFEN 10 MG/1
TABLET ORAL 3 TIMES DAILY
COMMUNITY

## 2024-08-01 ASSESSMENT — PAIN SCALES - GENERAL: PAINLEVEL: 8

## 2024-08-01 NOTE — PROGRESS NOTES
Name : Wilian Murry   : 1950   MRN : [id number]   ENC Date : 2024      Assessment and Plan:  Anterior leaflet mitral valve prolapse with severe mitral regurgitation: Patient does not want to consider mitral valve surgical repair.  I will refer him to our structural heart clinic to see if mitral valve percutaneous [id number] repair is possible.  I reviewed the images with Dr. Kitty gonsales.  Cervical neck pain: Patient is in significant discomfort.  It appears he has a neurosurgical appointment tomorrow.  Disp: Determine follow-up after neurosurgery and valve clinic opinions    HPI:  Patient returns today to review the results of his transesophageal echocardiogram.  He is adamant he does not want to consider surgical repair.  I reviewed the findings with him today.  He is mostly symptomatic from his neck.  It appears she has a neurosurgery appointment tomorrow.  He is willing to discuss with the structural heart team potential percutaneous options.    Problem list overview:   Patient Active Problem List   Diagnosis    Cervical neuropathy    Primary hypertension    Anxiety    TIA (transient ischemic attack)    Neck pain    Drug-seeking behavior    Chest pain       Meds:   Current Outpatient Medications on File Prior to Visit   Medication Sig Dispense Refill    acetaminophen (Tylenol) 325 mg tablet Take 2 tablets (650 mg) by mouth every 6 hours if needed for fever (temp greater than 38.0 C) (pain or fever).      atorvastatin (Lipitor) 80 mg tablet Take 1 tablet (80 mg) by mouth once daily at bedtime.      baclofen (Lioresal) 10 mg tablet Take by mouth 3 times a day.      bisacodyl (Dulcolax, bisacodyl,) 10 mg suppository Insert 1 suppository (10 mg) into the rectum once daily as needed for constipation.      cholecalciferol (Vitamin D-3) 50 MCG ( UT) tablet Take 0.5 tablets (25 mcg) by mouth once daily.      diclofenac sodium (Voltaren) 1 % gel Apply 4.5 inches (4 g) topically 2 times a day  as needed (pain). To neck      docusate sodium (Colace) 100 mg capsule Take 1 capsule (100 mg) by mouth every 12 hours if needed for constipation.      DULoxetine (Cymbalta) 60 mg DR capsule Take 1 capsule (60 mg) by mouth once daily as needed (anxiety).      fluticasone (Flonase) 50 mcg/actuation nasal spray Administer 1 spray into each nostril once daily.      furosemide (Lasix) 40 mg tablet Take 1 tablet (40 mg) by mouth once daily. Take for 5 days      gabapentin (Neurontin) 800 mg tablet Take 1 tablet (800 mg) by mouth every 8 hours.      guaiFENesin (Robitussin) 100 mg/5 mL syrup Take 10 mL (200 mg) by mouth every 4 hours if needed for cough or congestion.      lidocaine (Lidoderm) 5 % patch Place 1 patch on the skin once daily. To back of neck. Remove & discard patch within 12 hours or as directed by MD.      loperamide (Imodium A-D) 2 mg tablet Take 1 tablet (2 mg) by mouth every 12 hours if needed for diarrhea.      magnesium hydroxide (Milk of Magnesia) 400 mg/5 mL suspension Take 30 mL by mouth once daily as needed for constipation.      melatonin 3 mg tablet Take 1 tablet (3 mg) by mouth once daily at bedtime.      methocarbamol (Robaxin) 750 mg tablet Take 1 tablet (750 mg) by mouth 3 times a day.      metoprolol succinate XL (Kapspargo Sprinkle) 100 mg 24 hr capsule Take 1 capsule (100 mg) by mouth once daily.      oxyCODONE (Roxicodone) 5 mg immediate release tablet Take 1 tablet (5 mg) by mouth every 8 hours if needed for severe pain (7 - 10).      pantoprazole (ProtoNix) 40 mg EC tablet Take 1 tablet (40 mg) by mouth once daily in the morning. Take before meals. Do not crush, chew, or split.      psyllium (Metamucil) 0.4 gram capsule Take 5 capsules by mouth every 12 hours if needed (constipation).      senna 8.6 mg tablet Take 1 tablet (8.6 mg) by mouth once daily at bedtime.      sucralfate (Carafate) 1 gram tablet Take 1 tablet (1 g) by mouth 4 times a day before meals.      venlafaxine (Effexor)  "25 mg tablet Take 1 tablet (25 mg) by mouth once daily.      acetaminophen (Tylenol 8 HOUR) 650 mg ER tablet Take 1 tablet (650 mg) by mouth every 6 hours if needed for mild pain (1 - 3) or moderate pain (4 - 6). Do not crush, chew, or split.      alum-mag hydroxide-simeth (Maalox MAX) 400-400-40 mg/5 mL suspension Take 30 mL by mouth every 12 hours if needed for indigestion or heartburn.      DULoxetine (Cymbalta) 20 mg DR capsule Take 1 capsule (20 mg) by mouth once daily as needed (anxiety). Do not crush or chew.      DULoxetine 40 mg DR capsule Take 1 capsule (40 mg) by mouth once daily as needed (anxiety). Do not fill before July 12, 2024.      lidocaine 4 % patch Place 1 patch over 12 hours on the skin once daily. Remove & discard patch within 12 hours or as directed by MD. (Patient not taking: Reported on 8/1/2024)      venlafaxine (Effexor) 37.5 mg tablet Take 1 tablet (37.5 mg) by mouth once daily.       No current facility-administered medications on file prior to visit.        VS:  /76 (BP Location: Right arm, Patient Position: Sitting, BP Cuff Size: Adult)   Pulse 87   Ht 1.753 m (5' 9\")   Wt 78 kg (172 lb)   SpO2 93%   BMI 25.40 kg/m²     Vitals reviewed.   Neck:      Vascular: No JVD.   Pulmonary:      Breath sounds: Normal breath sounds.   Cardiovascular:      Normal rate. Regular rhythm.      Murmurs: There is a grade 3/6 systolic murmur.      No gallop.    Edema:     Peripheral edema absent.   Abdominal:      General: Abdomen is flat.      Palpations: Abdomen is soft.   Skin:     General: Skin is warm.         ECG: No results found for this or any previous visit.   ECHO: Results for orders placed during the hospital encounter of 07/25/24    Transesophageal Echo (AMADEO)    Narrative  [facility]  [address], [facility] [address]  Tel [phone] Fax [phone]    TRANSESOPHAGEAL ECHOCARDIOGRAM REPORT    Patient Name:      DEVON NOEL Reading Physician:    [id number] " Theresa Noel MD  Study Date:        7/25/2024            Ordering Provider:    [id number] THERESA NOEL  MRN/PID:           [id number]             Fellow:  Accession#:        [id number]         Nurse:                Sarika Villalobos RN  Date of Birth/Age: 1950 / 73 years  Sonographer:          Bharti Velez RDCS  Gender:            M                    Additional Staff:     Stacia Saleem RN  Height:            175.00 cm            Admit Date:  Weight:            77.57 kg             Admission Status:     Outpatient  BSA / BMI:         1.93 m2 / 25.33      Department Location:  [facility]  kg/m2  Blood Pressure: 120 /80 mmHg    Study Type:    TRANSESOPHAGEAL ECHO (AMADEO)  Diagnosis/ICD: Mitral valve disorder-I05.9  Indication:    mitral valve disorder  Study Detail: The following Echo studies were performed: 2D, Doppler and color  flow. Agitated saline used as a contrast agent for intraseptal  flow evaluation.      PHYSICIAN INTERPRETATION:  AMADEO Details: Technically adequate omniplane transesophageal echocardiogram performed. Color flow Doppler and agitated saline contrast echo was performed to assess for the presence of a patent foramen ovale.  AMADEO Medication: The patient was sedated using moderate sedation. Midazolam and Fentanyl was used to sedate the patient for this exam.  AMADEO Procedure: The probe was passed without difficulty. The following complication was encountered during the procedure: Patient tolerated the procedure well without any apparent complications.  Left Ventricle: The left ventricular systolic function is normal, with a visually estimated ejection fraction of 60-65%. There are no regional wall motion abnormalities. The left ventricular cavity size is normal. Left ventricular diastolic filling was not assessed.  Left Atrium: The left atrium is mildly dilated. A bubble study using agitated saline was technically inadequate to determine an atrial septal defect. Bubble study is negative. There is  a normal sized left atrial appendage and there is no thrombus visualized in the left atrial appendage.  Right Ventricle: The right ventricle is normal in size. There is normal right ventricular global systolic function. A device is visualized in the right ventricle.  Right Atrium: The right atrium is normal in size. There is a device visualized in the right atrium.  Aortic Valve: The aortic valve appears structurally normal. There is no evidence of aortic valve regurgitation.  Mitral Valve: The mitral valve is abnormal. There is mitral valve prolapse and Barlows syndrome is present. There is severe mitral valve prolapse of the anterior leaflet. There is severe mitral valve regurgitation.  Tricuspid Valve: The tricuspid valve is structurally normal. There is trace to mild tricuspid regurgitation.  Pulmonic Valve: The pulmonic valve is structurally normal. There is physiologic pulmonic valve regurgitation.  Pericardium: There is no pericardial effusion noted.  Aorta: The aortic root is normal.  Pulmonary Veins: There is flow reversal in the left pulmonary vein.      CONCLUSIONS:  1. The left ventricular systolic function is normal, with a visually estimated ejection fraction of 60-65%.  2. There is normal right ventricular global systolic function.  3. Severe mitral valve regurgitation.  4. There is severe mitral valve prolapse.    QUANTITATIVE DATA SUMMARY:  LV SYSTOLIC FUNCTION BY 2D PLANIMETRY (MOD):  Normal Ranges:  EF-Visual:      63 %  LV EF Reported: 63 %      [id number] Mendez Willams MD  Electronically signed on 7/26/2024 at 10:59:41 AM        ** Final **         Mendez Willams MD

## 2024-08-02 ENCOUNTER — APPOINTMENT (OUTPATIENT)
Dept: NEUROSURGERY | Facility: CLINIC | Age: 74
End: 2024-08-02
Payer: COMMERCIAL

## 2024-08-02 VITALS
HEART RATE: 76 BPM | DIASTOLIC BLOOD PRESSURE: 80 MMHG | SYSTOLIC BLOOD PRESSURE: 117 MMHG | HEIGHT: 69 IN | WEIGHT: 178 LBS | BODY MASS INDEX: 26.36 KG/M2

## 2024-08-02 DIAGNOSIS — G95.9 CERVICAL MYELOPATHY (MULTI): Primary | ICD-10-CM

## 2024-08-02 LAB
ATRIAL RATE: 75 BPM
P AXIS: 22 DEGREES
P OFFSET: 205 MS
P ONSET: 167 MS
PR INTERVAL: 196 MS
Q ONSET: 222 MS
QRS COUNT: 12 BEATS
QRS DURATION: 100 MS
QT INTERVAL: 394 MS
QTC CALCULATION(BAZETT): 439 MS
QTC FREDERICIA: 424 MS
R AXIS: 57 DEGREES
T AXIS: 46 DEGREES
T OFFSET: 419 MS
VENTRICULAR RATE: 75 BPM

## 2024-08-02 PROCEDURE — 3074F SYST BP LT 130 MM HG: CPT | Performed by: PHYSICIAN ASSISTANT

## 2024-08-02 PROCEDURE — 3008F BODY MASS INDEX DOCD: CPT | Performed by: PHYSICIAN ASSISTANT

## 2024-08-02 PROCEDURE — 3079F DIAST BP 80-89 MM HG: CPT | Performed by: PHYSICIAN ASSISTANT

## 2024-08-02 PROCEDURE — 99204 OFFICE O/P NEW MOD 45 MIN: CPT | Performed by: PHYSICIAN ASSISTANT

## 2024-08-02 ASSESSMENT — PATIENT HEALTH QUESTIONNAIRE - PHQ9
SUM OF ALL RESPONSES TO PHQ9 QUESTIONS 1 & 2: 0
1. LITTLE INTEREST OR PLEASURE IN DOING THINGS: NOT AT ALL
2. FEELING DOWN, DEPRESSED OR HOPELESS: NOT AT ALL

## 2024-08-02 NOTE — PROGRESS NOTES
[facility] [place]  Department of Neurological Surgery  New Patient Visit    History of Present Illness:  Wilian Murry is a 73 y.o. year old male who presents to the spine clinic with posterior neck pain with radiation into his arms bilaterally right slightly greater than left.  Also with significantly worsening weakness and sensory deficit.  Balance has also been worsening gradually to where he arrives in wheelchair today secondary to risk of fall.  Over the past 1 to 2 years this has been progressive.  He has progressed through physical therapy at rehab facility without any significant improvement.  Symptoms are worse when laying down and improved slightly by cervical traction however coordination and numbness has not improved.  Continues on gabapentin 600 mg 4 times daily.  Does have history of pacemaker secondary to heart block.        Prior Spine Surgeries: none    Physical Therapy: yes, prior at rehab  Diabetic: no   Osteoporosis: no  Patient's BMI is Body mass index is 26.29 kg/m².    14/14 systems reviewed and negative other than what is listed in the history of present illness    Patient Active Problem List   Diagnosis    Cervical neuropathy    Primary hypertension    Anxiety    TIA (transient ischemic attack)    Neck pain    Drug-seeking behavior    Chest pain     Past Medical History:   Diagnosis Date    Anxiety     Cervical disc disorder     Constipation     Depression     Diverticulitis     Hyperlipidemia     Hypertension     Muscle weakness     Nonrheumatic mitral (valve) prolapse     Pacemaker     Parkinson's disease (Multi)     Perforation of intestine (nontraumatic) (Multi)     Peritoneal adhesion     Polyneuropathy     Psychoactive substance abuse (Multi)     Sick sinus syndrome (Multi)     Syncope     TIA (transient ischemic attack)      No past surgical history on file.  Social History     Tobacco Use    Smoking status: Former     Average packs/day: 0.5 packs/day for  50.0 years (25.0 ttl pk-yrs)     Types: Cigarettes     Start date: 1974    Smokeless tobacco: Never   Substance Use Topics    Alcohol use: Not Currently     family history is not on file.    Current Outpatient Medications:     acetaminophen (Tylenol 8 HOUR) 650 mg ER tablet, Take 1 tablet (650 mg) by mouth every 6 hours if needed for mild pain (1 - 3) or moderate pain (4 - 6). Do not crush, chew, or split., Disp: , Rfl:     acetaminophen (Tylenol) 325 mg tablet, Take 2 tablets (650 mg) by mouth every 6 hours if needed for fever (temp greater than 38.0 C) (pain or fever)., Disp: , Rfl:     alum-mag hydroxide-simeth (Maalox MAX) 400-400-40 mg/5 mL suspension, Take 30 mL by mouth every 12 hours if needed for indigestion or heartburn., Disp: , Rfl:     atorvastatin (Lipitor) 80 mg tablet, Take 1 tablet (80 mg) by mouth once daily at bedtime., Disp: , Rfl:     baclofen (Lioresal) 10 mg tablet, Take by mouth 3 times a day., Disp: , Rfl:     bisacodyl (Dulcolax, bisacodyl,) 10 mg suppository, Insert 1 suppository (10 mg) into the rectum once daily as needed for constipation., Disp: , Rfl:     cholecalciferol (Vitamin D-3) 50 MCG (2000 UT) tablet, Take 0.5 tablets (25 mcg) by mouth once daily., Disp: , Rfl:     diclofenac sodium (Voltaren) 1 % gel, Apply 4.5 inches (4 g) topically 2 times a day as needed (pain). To neck, Disp: , Rfl:     docusate sodium (Colace) 100 mg capsule, Take 1 capsule (100 mg) by mouth every 12 hours if needed for constipation., Disp: , Rfl:     DULoxetine (Cymbalta) 20 mg DR capsule, Take 1 capsule (20 mg) by mouth once daily as needed (anxiety). Do not crush or chew., Disp: , Rfl:     DULoxetine (Cymbalta) 60 mg DR capsule, Take 1 capsule (60 mg) by mouth once daily as needed (anxiety)., Disp: , Rfl:     DULoxetine 40 mg DR capsule, Take 1 capsule (40 mg) by mouth once daily as needed (anxiety). Do not fill before July 12, 2024., Disp: , Rfl:     fluticasone (Flonase) 50 mcg/actuation nasal spray,  Administer 1 spray into each nostril once daily., Disp: , Rfl:     furosemide (Lasix) 40 mg tablet, Take 1 tablet (40 mg) by mouth once daily. Take for 5 days, Disp: , Rfl:     gabapentin (Neurontin) 800 mg tablet, Take 1 tablet (800 mg) by mouth every 8 hours., Disp: , Rfl:     guaiFENesin (Robitussin) 100 mg/5 mL syrup, Take 10 mL (200 mg) by mouth every 4 hours if needed for cough or congestion., Disp: , Rfl:     lidocaine (Lidoderm) 5 % patch, Place 1 patch on the skin once daily. To back of neck. Remove & discard patch within 12 hours or as directed by MD., Disp: , Rfl:     lidocaine 4 % patch, Place 1 patch over 12 hours on the skin once daily. Remove & discard patch within 12 hours or as directed by MD. (Patient not taking: Reported on 8/1/2024), Disp: , Rfl:     loperamide (Imodium A-D) 2 mg tablet, Take 1 tablet (2 mg) by mouth every 12 hours if needed for diarrhea., Disp: , Rfl:     magnesium hydroxide (Milk of Magnesia) 400 mg/5 mL suspension, Take 30 mL by mouth once daily as needed for constipation., Disp: , Rfl:     melatonin 3 mg tablet, Take 1 tablet (3 mg) by mouth once daily at bedtime., Disp: , Rfl:     methocarbamol (Robaxin) 750 mg tablet, Take 1 tablet (750 mg) by mouth 3 times a day., Disp: , Rfl:     metoprolol succinate XL (Kapspargo Sprinkle) 100 mg 24 hr capsule, Take 1 capsule (100 mg) by mouth once daily., Disp: , Rfl:     oxyCODONE (Roxicodone) 5 mg immediate release tablet, Take 1 tablet (5 mg) by mouth every 8 hours if needed for severe pain (7 - 10)., Disp: , Rfl:     pantoprazole (ProtoNix) 40 mg EC tablet, Take 1 tablet (40 mg) by mouth once daily in the morning. Take before meals. Do not crush, chew, or split., Disp: , Rfl:     psyllium (Metamucil) 0.4 gram capsule, Take 5 capsules by mouth every 12 hours if needed (constipation)., Disp: , Rfl:     senna 8.6 mg tablet, Take 1 tablet (8.6 mg) by mouth once daily at bedtime., Disp: , Rfl:     sucralfate (Carafate) 1 gram tablet,  Take 1 tablet (1 g) by mouth 4 times a day before meals., Disp: , Rfl:     venlafaxine (Effexor) 25 mg tablet, Take 1 tablet (25 mg) by mouth once daily., Disp: , Rfl:     venlafaxine (Effexor) 37.5 mg tablet, Take 1 tablet (37.5 mg) by mouth once daily., Disp: , Rfl:   No Known Allergies    Physical Examination    General: Well developed, awake/alert/oriented x3, no distress, alert and cooperative  Skin: Warm and dry, no lesions, no rashes  ENMT: Mucous membranes moist, no apparent injury, no lesions seen  Head/Neck: Neck Supple, no apparent injury  Respiratory/Thorax: Normal breath sounds with good chest expansion, thorax symmetric  Cardiovascular: No pitting edema, no JVD    Motor Strength: 3+/5 left biceps, triceps, wrist, , 4/5 right upper extremity   Muscle Bulk: Normal and symmetric in all extremities    Posture:   -- Cervical: Loss of cervical lordosis  -- Thoracic: Normal  -- Lumbar : Normal  Paraspinal muscle spasm/tenderness present cervical spine  Midline tenderness present    Sensation: Moderate deficit to sensation bilaterally left greater than right in hands and continuing mid forearm, lower extremities intact to light touch    Hyperreflexia identified through brachioradialis and biceps bilaterally with left Nikki positive    Assessment and Plan:  Wilian Murry is a 73 y.o. year old male who presents to the spine clinic with posterior neck pain with radiation into his arms bilaterally right slightly greater than left.  Also with significantly worsening weakness and sensory deficit.  Balance has also been worsening gradually to where he arrives in wheelchair today secondary to risk of fall.  Over the past 1 to 2 years this has been progressive.  He has progressed through physical therapy at rehab facility without any significant improvement.  Symptoms are worse when laying down and improved slightly by cervical traction however coordination and numbness has not improved.  Continues on  gabapentin 600 mg 4 times daily.  Does have history of pacemaker secondary to heart block.    With hyperreflexia and upper extremity weakness and numbness on exam and concerned about cervical myelopathy secondary to cord compression for Mr. Murry.  Up right x-rays with flexion-extension will be ordered at this time to evaluate for overall alignment and dynamic stability.  MRI most important to evaluate patency of central canal.  If MRI unable to be performed secondary to pacemaker will follow-up with CT myelogram.        The above clinical summary has been dictated with voice recognition software. It has not been proofread for grammatical errors, typographical mistakes, or other semantic inconsistencies.    Thank you for visiting our office today. It was our pleasure to take part in your healthcare.     Do not hesitate to call with any questions regarding your plan of care after leaving at [phone] M-F 8am-4pm.     To clinicians, thank you very much for this kind referral. It is a privilege to partner with you in the care of your patients. My office would be delighted to assist you with any further consultations or with questions regarding the plan of care outlined. Do not hesitate to call the office or contact me directly.       Sincerely,      GINNA Pardo, PA-C  Associate Physician Assistant, Neurosurgery  Clinical   [facility] [employer] [place] [address]    Phone: [phone]  Fax: [phone]

## 2024-08-12 ENCOUNTER — APPOINTMENT (OUTPATIENT)
Dept: RADIOLOGY | Facility: HOSPITAL | Age: 74
End: 2024-08-12
Payer: COMMERCIAL

## 2024-08-12 ENCOUNTER — HOSPITAL ENCOUNTER (EMERGENCY)
Facility: HOSPITAL | Age: 74
Discharge: HOME | End: 2024-08-12
Attending: EMERGENCY MEDICINE
Payer: COMMERCIAL

## 2024-08-12 VITALS
TEMPERATURE: 97.5 F | SYSTOLIC BLOOD PRESSURE: 122 MMHG | BODY MASS INDEX: 25.48 KG/M2 | OXYGEN SATURATION: 97 % | HEIGHT: 69 IN | HEART RATE: 75 BPM | RESPIRATION RATE: 18 BRPM | WEIGHT: 172 LBS | DIASTOLIC BLOOD PRESSURE: 76 MMHG

## 2024-08-12 DIAGNOSIS — M54.2 NECK PAIN: Primary | ICD-10-CM

## 2024-08-12 PROCEDURE — 2500000004 HC RX 250 GENERAL PHARMACY W/ HCPCS (ALT 636 FOR OP/ED)

## 2024-08-12 PROCEDURE — 71101 X-RAY EXAM UNILAT RIBS/CHEST: CPT | Mod: LEFT SIDE | Performed by: RADIOLOGY

## 2024-08-12 PROCEDURE — 99284 EMERGENCY DEPT VISIT MOD MDM: CPT

## 2024-08-12 PROCEDURE — 71101 X-RAY EXAM UNILAT RIBS/CHEST: CPT | Mod: LT

## 2024-08-12 PROCEDURE — 73030 X-RAY EXAM OF SHOULDER: CPT | Mod: LT

## 2024-08-12 PROCEDURE — 2500000001 HC RX 250 WO HCPCS SELF ADMINISTERED DRUGS (ALT 637 FOR MEDICARE OP)

## 2024-08-12 PROCEDURE — 73030 X-RAY EXAM OF SHOULDER: CPT | Mod: LEFT SIDE | Performed by: RADIOLOGY

## 2024-08-12 RX ORDER — PREDNISONE 50 MG/1
50 TABLET ORAL ONCE
Status: COMPLETED | OUTPATIENT
Start: 2024-08-12 | End: 2024-08-12

## 2024-08-12 RX ORDER — OXYCODONE HYDROCHLORIDE 5 MG/1
5 TABLET ORAL ONCE
Status: COMPLETED | OUTPATIENT
Start: 2024-08-12 | End: 2024-08-12

## 2024-08-12 RX ORDER — PREDNISONE 50 MG/1
50 TABLET ORAL DAILY
Qty: 5 TABLET | Refills: 0 | Status: SHIPPED | OUTPATIENT
Start: 2024-08-12 | End: 2024-08-17

## 2024-08-12 ASSESSMENT — PAIN - FUNCTIONAL ASSESSMENT
PAIN_FUNCTIONAL_ASSESSMENT: 0-10
PAIN_FUNCTIONAL_ASSESSMENT: 0-10

## 2024-08-12 ASSESSMENT — PAIN DESCRIPTION - LOCATION: LOCATION: BACK

## 2024-08-12 ASSESSMENT — PAIN SCALES - GENERAL
PAINLEVEL_OUTOF10: 0 - NO PAIN
PAINLEVEL_OUTOF10: 9
PAINLEVEL_OUTOF10: 0 - NO PAIN
PAINLEVEL_OUTOF10: 0 - NO PAIN
PAINLEVEL_OUTOF10: 10 - WORST POSSIBLE PAIN
PAINLEVEL_OUTOF10: 0 - NO PAIN

## 2024-08-12 ASSESSMENT — LIFESTYLE VARIABLES
TOTAL SCORE: 0
EVER HAD A DRINK FIRST THING IN THE MORNING TO STEADY YOUR NERVES TO GET RID OF A HANGOVER: NO
EVER FELT BAD OR GUILTY ABOUT YOUR DRINKING: NO
HAVE YOU EVER FELT YOU SHOULD CUT DOWN ON YOUR DRINKING: NO
HAVE PEOPLE ANNOYED YOU BY CRITICIZING YOUR DRINKING: NO

## 2024-08-12 ASSESSMENT — PAIN DESCRIPTION - ORIENTATION: ORIENTATION: LEFT

## 2024-08-12 ASSESSMENT — PAIN DESCRIPTION - PAIN TYPE: TYPE: CHRONIC PAIN

## 2024-08-12 ASSESSMENT — PAIN DESCRIPTION - FREQUENCY: FREQUENCY: CONSTANT/CONTINUOUS

## 2024-08-12 ASSESSMENT — PAIN DESCRIPTION - DESCRIPTORS: DESCRIPTORS: ACHING

## 2024-08-12 NOTE — DISCHARGE INSTRUCTIONS
We have given you prescriptions for prednisone to help with your symptoms at home.  It is very important that you follow-up with your primary care provider for ongoing management of your pain and symptoms.  Please do not hesitate to return to the ER or seek immediate medical attention if you begin to have any of the following symptoms: Numbness in your groin, numbness and weakness in your lower extremities Or upper extremity,inability to control your lower extremities, loss of control of your bladder or bowels, or any new or concerning symptoms.

## 2024-08-12 NOTE — ED NOTES
RN has attempted to call [facility] three times. I also attempted to reach unit manager and was sent to voicemail. Charge RN aware      Verna Mcqueen RN  08/12/24 0931       Verna Mcqueen RN  08/12/24 0920

## 2024-08-12 NOTE — ED PROVIDER NOTES
HPI   Chief Complaint   Patient presents with   • Back Pain     Pt c/o chronic back pain that is intolerable.        This is a 74 years old male patient presented to the emergency department with a chief complaint of neck pain.  Stated that he has history of chronic neck pain and this feels exactly like his previous multiple episodes of neck pain exacerbation.  Denies any chest pain, shortness of breath, cough, headache, lightheadedness, fever, chills, body aches, abdominal pain, weakness, focal numbness, weakness or numbness to the upper extremities that is out of ordinary, denies loss of control over bowel or bladder, denies saddle anesthesia.  Stated that he fell 36 hours ago off the bed but did not hit his head and landed on the left shoulder and complaining of left-sided upper rib pain.    Review of system: As stated above in the HPI section            Patient History   Past Medical History:   Diagnosis Date   • Anxiety    • Cervical disc disorder    • Constipation    • Depression    • Diverticulitis    • Hyperlipidemia    • Hypertension    • Muscle weakness    • Nonrheumatic mitral (valve) prolapse    • Pacemaker    • Parkinson's disease (Multi)    • Perforation of intestine (nontraumatic) (Multi)    • Peritoneal adhesion    • Polyneuropathy    • Psychoactive substance abuse (Multi)    • Sick sinus syndrome (Multi)    • Syncope    • TIA (transient ischemic attack)      History reviewed. No pertinent surgical history.  No family history on file.  Social History     Tobacco Use   • Smoking status: Former     Average packs/day: 0.5 packs/day for 50.0 years (25.0 ttl pk-yrs)     Types: Cigarettes     Start date: 1974   • Smokeless tobacco: Never   Vaping Use   • Vaping status: Never Used   Substance Use Topics   • Alcohol use: Not Currently   • Drug use: Never       Physical Exam   ED Triage Vitals [08/12/24 0239]   Temperature Heart Rate Respirations BP   36.2 °C (97.2 °F) (!) 46 20 125/83      Pulse Ox Temp  Source Heart Rate Source Patient Position   98 % Temporal Monitor Sitting      BP Location FiO2 (%)     Right arm --       Physical Exam  Vitals and nursing note reviewed.   Constitutional:       General: He is not in acute distress.     Appearance: He is well-developed.   HENT:      Head: Normocephalic and atraumatic.   Eyes:      Conjunctiva/sclera: Conjunctivae normal.   Cardiovascular:      Rate and Rhythm: Normal rate and regular rhythm.      Heart sounds: No murmur heard.  Pulmonary:      Effort: Pulmonary effort is normal. No respiratory distress.      Breath sounds: Normal breath sounds.      Comments: Tenderness over the left anterior superior part of the rib cage around rib 2/3.  Limited range of motion to the left shoulder which is chronic but the patient stated that started to get worse in terms of pain after the fall and landing on his left shoulder.  Patient has intact sensation over the deltoid muscle and intact distal pulses.  He has intact motor functionTo the hands bilaterally.  Chest:      Chest wall: Tenderness present.   Abdominal:      Palpations: Abdomen is soft.      Tenderness: There is no abdominal tenderness.   Musculoskeletal:         General: No swelling.      Cervical back: Neck supple.   Skin:     General: Skin is warm and dry.      Capillary Refill: Capillary refill takes less than 2 seconds.   Neurological:      Mental Status: He is alert.   Psychiatric:         Mood and Affect: Mood normal.       ED Course & MDM   ED Course as of 08/23/24 2215   Mon Aug 12, 2024   0756 IMPRESSION:  No acute findings.  Chronic right clavicle fracture with overriding.   [TL]      ED Course User Index  [TL] Jomar Luu DO         Diagnoses as of 08/23/24 2215   Neck pain                 No data recorded     Coin Coma Scale Score: 15 (08/12/24 0244 : Minal Willams RN)                           Medical Decision Making  Patient seen and examined, please patient is concerning for exacerbation of the  chronic neck pain.  Stated that he is in the process of getting scheduled for cervical fusion.  We will administer a steroid.  No red flags to the neck pain.  He is neurovascularly intact.  Given the fall and the left shoulder pain and left sided rib pain will obtain rib series as well as left-sided shoulder x-ray.  If all unremarkable will be able to discharge the patient on steroid to follow-up with orthopedic surgery team/orthospine and to return to the emergency department if alarming symptoms arise such as worsening neck pain, weakness or numbness to the upper extremities, or if concerns arise.        Procedure  Procedures     Orville Casey, DO  08/12/24 0652       Orville Casey, DO  08/12/24 0652       Orville Casey, DO  08/23/24 2219

## 2024-08-25 ENCOUNTER — APPOINTMENT (OUTPATIENT)
Dept: RADIOLOGY | Facility: HOSPITAL | Age: 74
End: 2024-08-25
Payer: COMMERCIAL

## 2024-08-25 ENCOUNTER — HOSPITAL ENCOUNTER (EMERGENCY)
Facility: HOSPITAL | Age: 74
Discharge: HOME | End: 2024-08-25
Attending: EMERGENCY MEDICINE
Payer: COMMERCIAL

## 2024-08-25 VITALS
HEART RATE: 71 BPM | TEMPERATURE: 98.4 F | DIASTOLIC BLOOD PRESSURE: 69 MMHG | WEIGHT: 172 LBS | HEIGHT: 69 IN | SYSTOLIC BLOOD PRESSURE: 112 MMHG | BODY MASS INDEX: 25.48 KG/M2 | RESPIRATION RATE: 18 BRPM | OXYGEN SATURATION: 95 %

## 2024-08-25 DIAGNOSIS — S42.224A CLOSED 2-PART NONDISPLACED FRACTURE OF SURGICAL NECK OF RIGHT HUMERUS, INITIAL ENCOUNTER: Primary | ICD-10-CM

## 2024-08-25 PROCEDURE — 99285 EMERGENCY DEPT VISIT HI MDM: CPT | Performed by: EMERGENCY MEDICINE

## 2024-08-25 PROCEDURE — 72125 CT NECK SPINE W/O DYE: CPT | Performed by: STUDENT IN AN ORGANIZED HEALTH CARE EDUCATION/TRAINING PROGRAM

## 2024-08-25 PROCEDURE — 71101 X-RAY EXAM UNILAT RIBS/CHEST: CPT | Mod: RIGHT SIDE | Performed by: RADIOLOGY

## 2024-08-25 PROCEDURE — 73030 X-RAY EXAM OF SHOULDER: CPT | Mod: RT

## 2024-08-25 PROCEDURE — 70450 CT HEAD/BRAIN W/O DYE: CPT | Performed by: STUDENT IN AN ORGANIZED HEALTH CARE EDUCATION/TRAINING PROGRAM

## 2024-08-25 PROCEDURE — 73030 X-RAY EXAM OF SHOULDER: CPT | Mod: RIGHT SIDE | Performed by: RADIOLOGY

## 2024-08-25 PROCEDURE — 73060 X-RAY EXAM OF HUMERUS: CPT | Mod: RT

## 2024-08-25 PROCEDURE — 99285 EMERGENCY DEPT VISIT HI MDM: CPT | Mod: 25

## 2024-08-25 PROCEDURE — 71101 X-RAY EXAM UNILAT RIBS/CHEST: CPT | Mod: RT

## 2024-08-25 PROCEDURE — 72125 CT NECK SPINE W/O DYE: CPT

## 2024-08-25 PROCEDURE — 73060 X-RAY EXAM OF HUMERUS: CPT | Mod: RIGHT SIDE | Performed by: RADIOLOGY

## 2024-08-25 PROCEDURE — 2500000001 HC RX 250 WO HCPCS SELF ADMINISTERED DRUGS (ALT 637 FOR MEDICARE OP)

## 2024-08-25 PROCEDURE — 70450 CT HEAD/BRAIN W/O DYE: CPT

## 2024-08-25 PROCEDURE — 2500000004 HC RX 250 GENERAL PHARMACY W/ HCPCS (ALT 636 FOR OP/ED)

## 2024-08-25 PROCEDURE — 96372 THER/PROPH/DIAG INJ SC/IM: CPT

## 2024-08-25 RX ORDER — MORPHINE SULFATE 4 MG/ML
4 INJECTION, SOLUTION INTRAMUSCULAR; INTRAVENOUS ONCE
Status: COMPLETED | OUTPATIENT
Start: 2024-08-25 | End: 2024-08-25

## 2024-08-25 RX ORDER — OXYCODONE HYDROCHLORIDE 5 MG/1
5 TABLET ORAL ONCE
Status: COMPLETED | OUTPATIENT
Start: 2024-08-25 | End: 2024-08-25

## 2024-08-25 RX ORDER — KETOROLAC TROMETHAMINE 15 MG/ML
15 INJECTION, SOLUTION INTRAMUSCULAR; INTRAVENOUS ONCE
Status: COMPLETED | OUTPATIENT
Start: 2024-08-25 | End: 2024-08-25

## 2024-08-25 RX ORDER — ACETAMINOPHEN 325 MG/1
975 TABLET ORAL ONCE
Status: COMPLETED | OUTPATIENT
Start: 2024-08-25 | End: 2024-08-25

## 2024-08-25 ASSESSMENT — LIFESTYLE VARIABLES
HAVE PEOPLE ANNOYED YOU BY CRITICIZING YOUR DRINKING: NO
TOTAL SCORE: 0
HAVE YOU EVER FELT YOU SHOULD CUT DOWN ON YOUR DRINKING: NO
EVER FELT BAD OR GUILTY ABOUT YOUR DRINKING: NO
EVER HAD A DRINK FIRST THING IN THE MORNING TO STEADY YOUR NERVES TO GET RID OF A HANGOVER: NO

## 2024-08-25 ASSESSMENT — COLUMBIA-SUICIDE SEVERITY RATING SCALE - C-SSRS
2. HAVE YOU ACTUALLY HAD ANY THOUGHTS OF KILLING YOURSELF?: NO
6. HAVE YOU EVER DONE ANYTHING, STARTED TO DO ANYTHING, OR PREPARED TO DO ANYTHING TO END YOUR LIFE?: NO
1. IN THE PAST MONTH, HAVE YOU WISHED YOU WERE DEAD OR WISHED YOU COULD GO TO SLEEP AND NOT WAKE UP?: NO

## 2024-08-25 ASSESSMENT — PAIN - FUNCTIONAL ASSESSMENT
PAIN_FUNCTIONAL_ASSESSMENT: 0-10

## 2024-08-25 ASSESSMENT — PAIN SCALES - GENERAL
PAINLEVEL_OUTOF10: 0 - NO PAIN
PAINLEVEL_OUTOF10: 7
PAINLEVEL_OUTOF10: 10 - WORST POSSIBLE PAIN
PAINLEVEL_OUTOF10: 5 - MODERATE PAIN
PAINLEVEL_OUTOF10: 10 - WORST POSSIBLE PAIN

## 2024-08-25 ASSESSMENT — PAIN DESCRIPTION - ORIENTATION
ORIENTATION: RIGHT
ORIENTATION: RIGHT

## 2024-08-25 ASSESSMENT — PAIN DESCRIPTION - LOCATION
LOCATION: SHOULDER
LOCATION: ARM

## 2024-08-25 ASSESSMENT — PAIN DESCRIPTION - PAIN TYPE: TYPE: ACUTE PAIN

## 2024-08-25 NOTE — ED PROVIDER NOTES
HPI   Chief Complaint   Patient presents with    Fall     Pt reportd rolling out of bed and hitting his right upper arm on the bedside table. MSP intact over right arm.        This is a 74 years old male patient presented to the emergency department after he rolled out of the bed, denies hitting his head or losing consciousness.  Denies neck pain, upper or lower back pain.  Denies new numbness, weakness of the upper or lower extremities.  Denies chest pain, shortness of breath, cough, fever, chills, body aches.  Reported right-sided shoulder, arm, and elbow pain.  Stated that his pain is 8 out of 10 in severity.    Review of system: As stated above in the HPI section.  Patient came by squad and denies receiving any pain medicine route to the hospital.              Patient History   Past Medical History:   Diagnosis Date    Anxiety     Cervical disc disorder     Constipation     Depression     Diverticulitis     Hyperlipidemia     Hypertension     Muscle weakness     Nonrheumatic mitral (valve) prolapse     Pacemaker     Parkinson's disease (Multi)     Perforation of intestine (nontraumatic) (Multi)     Peritoneal adhesion     Polyneuropathy     Psychoactive substance abuse (Multi)     Sick sinus syndrome (Multi)     Syncope     TIA (transient ischemic attack)      No past surgical history on file.  No family history on file.  Social History     Tobacco Use    Smoking status: Former     Average packs/day: 0.5 packs/day for 50.0 years (25.0 ttl pk-yrs)     Types: Cigarettes     Start date: 1974    Smokeless tobacco: Never   Vaping Use    Vaping status: Never Used   Substance Use Topics    Alcohol use: Not Currently    Drug use: Never       Physical Exam   ED Triage Vitals [08/25/24 0200]   Temperature Heart Rate Respirations BP   36.3 °C (97.3 °F) 77 16 164/88      Pulse Ox Temp src Heart Rate Source Patient Position   96 % -- -- --      BP Location FiO2 (%)     Left arm --       Physical Exam  Vitals and nursing note  reviewed.   Constitutional:       General: He is not in acute distress.     Appearance: He is well-developed.   HENT:      Head: Normocephalic and atraumatic.   Eyes:      Conjunctiva/sclera: Conjunctivae normal.   Neck:      Comments: No midline tenderness of the cervical, thoracic, lumbar, or sacral region.  Cardiovascular:      Rate and Rhythm: Normal rate and regular rhythm.      Heart sounds: No murmur heard.  Pulmonary:      Effort: Pulmonary effort is normal. No respiratory distress.      Breath sounds: Normal breath sounds.      Comments: Tenderness over the right anterior and lateral aspect of the chest wall.  Chest:      Chest wall: Tenderness present.   Abdominal:      Palpations: Abdomen is soft.      Tenderness: There is no abdominal tenderness.   Musculoskeletal:         General: No swelling.      Cervical back: Neck supple.      Comments: Tenderness upon palpating the right shoulder, right arm.  Patient has intact distal pulses, intact capillary fill less than 2 seconds, intact sensation, and motor function but motor function limited secondary to the right shoulder, arm, and elbow pain.   Skin:     General: Skin is warm and dry.      Capillary Refill: Capillary refill takes less than 2 seconds.   Neurological:      Mental Status: He is alert and oriented to person, place, and time.      Comments: NIH stroke scale of 0   Psychiatric:         Mood and Affect: Mood normal.           ED Course & MDM   ED Course as of 08/25/24 0659   Sun Aug 25, 2024   0539 CT head and C-spine unimpressive for ICH or fracture. [ES]   0539 XR humerus right  Surgical neck fracture appreciated. [ES]   0539 XR ribs right 2 views w chest pa or ap  Fracture of the right fourth rib laterally. [ES]      ED Course User Index  [ES] Alok Gamez MD         Diagnoses as of 08/25/24 0659   Closed 2-part nondisplaced fracture of surgical neck of right humerus, initial encounter                 No data recorded     [place] Coma Scale  Score: 15 (08/25/24 0204 : Dante Betancur RN)                           Medical Decision Making  Patient seen and examined, will obtain x-ray to the right elbow, shoulder, and x-ray to the chest with right-sided review.  Will administer Tylenol for pain control.X-ray to the right shoulder reveals surgical humeral neck fracture, right fourth rib fracture.  Will scan the patient head, neck given the presence of a distracting injury.  CT head with a concern of normal pressure hydrocephalus.  No urinary symptoms reported by the patient and he is walking steadily.  Patient is discharged after applying a sling, patient is discharged to follow-up with orthopedic surgery team and to return to the emergency department if alarming symptoms arise.        Procedure  Procedures     Orville Casey,   08/25/24 0702

## 2024-08-25 NOTE — DISCHARGE INSTRUCTIONS
Please utilize anti-inflammatories acetaminophen (Tylenol) and ibuprofen (Advil) or naproxen (aleve) for your pain as recommended. You can take both acetaminophen and ibuprofen together.     Follow-up with the provided orthopedic surgeon at the first available appointment.    Seek immediate medical attention should you have:   Numbness, tingling, weakness,  fever of 38 C (100.4 F), chills, nausea, palpitations, confusion, vomiting, dizziness, painful urination, inability to control your urine or bowel movements, or any other concerning symptoms.

## 2024-09-06 ENCOUNTER — APPOINTMENT (OUTPATIENT)
Dept: CARDIOLOGY | Facility: HOSPITAL | Age: 74
End: 2024-09-06
Payer: COMMERCIAL

## 2024-09-12 ENCOUNTER — TELEPHONE (OUTPATIENT)
Dept: CARDIOLOGY | Facility: HOSPITAL | Age: 74
End: 2024-09-12
Payer: COMMERCIAL

## 2024-09-12 NOTE — TELEPHONE ENCOUNTER
Rn coordinator placed call to pts facilty. Per staff patient is still admitted to outside hospital. Will continue to follow up to reschedule

## 2024-09-19 ENCOUNTER — TELEPHONE (OUTPATIENT)
Dept: CARDIOLOGY | Facility: HOSPITAL | Age: 74
End: 2024-09-19
Payer: COMMERCIAL

## 2024-09-19 NOTE — TELEPHONE ENCOUNTER
Voicemail left for Nancy Castano at [facility] re: need for appointment, referral from Dr. Willams for evaluation/tx of severe MRJenni   Patient recently moved to [place] from [facility] following hospitalization for UE fracture.   Updated patients sister Bharti ([phone]) regarding referral and need for appointment; she currently resides in [place].

## 2024-10-28 ENCOUNTER — APPOINTMENT (OUTPATIENT)
Dept: NEUROLOGY | Facility: CLINIC | Age: 74
End: 2024-10-28
Payer: COMMERCIAL

## 2024-11-02 NOTE — PROGRESS NOTES
Referred by Dr. Willams     History Of Present Illness:    Wilian Murry is a 74 y.o. male presenting with severe mitral regurgitation  Has stated adamantly dose not want surgical repair        NYHA   Frailty   STS PROM for isolated MVR and MVr  EKG  TTE 6/2024 - LVEF 60%, no significant aortic valve disease, anterior leaflet prolapse, eccentric MR jet difficult to quantify  AMADEO 7/25/2024 - LVEF 55%, severe mitral regurgitation secondary to anterior leaflet prolapse. Myxomatous leaflets. Mild TR. No significant aortic valve disease  Cath- pending  NM Stress test 6/2024 - No ischemia  CMR  K[facility]Q   Dental         PAST MEDICAL HISTORY  HTN  SSS s/p pacemaker  Parkinsons  Recurrent falls - last Jan 2024 - L. Rib fractures, small left hip effusion and compression deformities.. 9/2024 - right humeral and hip fractures  Perforated ulcer  s/p opean repair with Grahams patch 9/2023  Tobacco use  ? Substance abuse          Past Medical History:  He has a past medical history of Anxiety, Cervical disc disorder, Constipation, Depression, Diverticulitis, Hyperlipidemia, Hypertension, Muscle weakness, Nonrheumatic mitral (valve) prolapse, Pacemaker, Parkinson's disease (Multi), Perforation of intestine (nontraumatic) (Multi), Peritoneal adhesion, Polyneuropathy, Psychoactive substance abuse (Multi), Sick sinus syndrome (Multi), Syncope, and TIA (transient ischemic attack).    Past Surgical History:  He has no past surgical history on file.      Social History:  He reports that he has quit smoking. His smoking use included cigarettes. He started smoking about 50 years ago. He has a 25 pack-year smoking history. He has never used smokeless tobacco. He reports that he does not currently use alcohol. He reports that he does not use drugs.    Family History:  No family history on file.     Allergies:  Patient has no known allergies.    Outpatient Medications:  Current Outpatient Medications   Medication Instructions     acetaminophen (TYLENOL 8 HOUR) 650 mg, oral, Every 6 hours PRN, Do not crush, chew, or split.    acetaminophen (TYLENOL) 650 mg, oral, Every 6 hours PRN    alum-mag hydroxide-simeth (Maalox MAX) 400-400-40 mg/5 mL suspension 30 mL, oral, Every 12 hours PRN    atorvastatin (LIPITOR) 80 mg, oral, Nightly    baclofen (Lioresal) 10 mg tablet oral, 3 times daily    bisacodyl (DULCOLAX (BISACODYL)) 10 mg, rectal, Daily PRN    cholecalciferol (VITAMIN D-3) 25 mcg, oral, Daily    diclofenac sodium (VOLTAREN) 4 g, Topical, 2 times daily PRN, To neck    docusate sodium (COLACE) 100 mg, oral, Every 12 hours PRN    DULoxetine (CYMBALTA) 60 mg, oral, Daily PRN    DULoxetine (CYMBALTA) 20 mg, oral, Daily PRN, Do not crush or chew.    DULoxetine 40 mg, oral, Daily PRN    fluticasone (Flonase) 50 mcg/actuation nasal spray 1 spray, Each Nostril, Daily    furosemide (LASIX) 40 mg, oral, Daily, Take for 5 days    gabapentin (NEURONTIN) 800 mg, oral, Every 8 hours    guaiFENesin (ROBITUSSIN) 200 mg, oral, Every 4 hours PRN    lidocaine (Lidoderm) 5 % patch 1 patch, transdermal, Daily, To back of neck. Remove & discard patch within 12 hours or as directed by MD.    lidocaine 4 % patch 1 patch, transdermal, Daily, Remove & discard patch within 12 hours or as directed by MD.    loperamide (IMODIUM A-D) 2 mg, oral, Every 12 hours PRN    magnesium hydroxide (Milk of Magnesia) 400 mg/5 mL suspension 30 mL, oral, Daily PRN    melatonin 3 mg, oral, Nightly    methocarbamol (ROBAXIN) 750 mg, oral, 3 times daily    metoprolol succinate XL (KAPSPARGO SPRINKLE) 100 mg, oral, Daily    oxyCODONE (ROXICODONE) 5 mg, oral, Every 8 hours PRN    pantoprazole (PROTONIX) 40 mg, oral, Daily before breakfast, Do not crush, chew, or split.    psyllium (Metamucil) 0.4 gram capsule 5 capsules, oral, Every 12 hours PRN    senna 8.6 mg tablet 1 tablet, oral, Nightly    sucralfate (CARAFATE) 1 g, oral, 4 times daily before meals and nightly    venlafaxine  "(EFFEXOR) 25 mg, oral, Daily    venlafaxine (EFFEXOR) 37.5 mg, oral, Daily        Last Recorded Vitals:  There were no vitals filed for this visit.    Physical Exam:  ***       Last Labs:  CBC -  Lab Results   Component Value Date    WBC 10.9 10/22/2024    HGB 11.8 (L) 10/22/2024    HCT 37.1 (L) 10/22/2024    MCV 89 10/22/2024     10/22/2024       CMP -  Lab Results   Component Value Date    CALCIUM 9.0 10/22/2024    PROT 5.8 (L) 10/22/2024    ALBUMIN 3.6 10/22/2024    AST 14 10/22/2024    ALT 15 10/22/2024    ALKPHOS 90 10/22/2024    BILITOT 0.3 10/22/2024       LIPID PANEL -   No results found for: \"CHOL\", \"TRIG\", \"HDL\", \"CHHDL\", \"LDLF\", \"VLDL\", \"NHDL\"    RENAL FUNCTION PANEL -   Lab Results   Component Value Date    GLUCOSE 68 (L) 10/22/2024     10/22/2024    K 4.2 10/22/2024     10/22/2024    CO2 23 10/22/2024    ANIONGAP 13 10/22/2024    BUN 23 10/22/2024    CREATININE 0.95 10/22/2024    CALCIUM 9.0 10/22/2024    ALBUMIN 3.6 10/22/2024        Lab Results   Component Value Date    BNP 80 06/26/2024    HGBA1C 4.9 02/23/2022     ASSESSMENT AND PLAN  Severe primary mitral regurgitation  Recurrent falls      Lelo Tang MD  "

## 2024-11-08 ENCOUNTER — APPOINTMENT (OUTPATIENT)
Dept: CARDIAC SURGERY | Facility: HOSPITAL | Age: 74
End: 2024-11-08
Payer: COMMERCIAL

## 2024-11-08 ENCOUNTER — APPOINTMENT (OUTPATIENT)
Dept: CARDIOLOGY | Facility: HOSPITAL | Age: 74
End: 2024-11-08
Payer: COMMERCIAL

## 2024-11-08 DIAGNOSIS — I34.0 SEVERE MITRAL REGURGITATION: Primary | ICD-10-CM

## 2024-11-19 NOTE — PROGRESS NOTES
[facility]    Referred by Mendez Willams MD for Mitral Valve Regurgitation      HPI:   Wilian Murry is a 74 y.o with a PMHx sig for TIA, cervical neuropathy, HTN, and severe Mitral Valve Regurgitation.  Reviewed today as part of multidisciplinary team with Dr. Tang.     Patient presents with no significant cardiac symptoms particularly no dyspnea or chest pain.  He has had no hospitalizations for heart failure.    He lives in a nursing home and ambulates with a walker over the wheelchair.      Investigations:  AMADEO (7/25/24)   1. The left ventricular systolic function is normal, with a visually estimated ejection fraction of 60-65%.   2. There is normal right ventricular global systolic function.   3. Severe mitral valve regurgitation.   4. There is severe mitral valve prolapse.    TTE (6/26/24)  1. The left ventricular systolic function is normal, with a Ta's biplane calculated ejection fraction of 60%.   2. Spectral Doppler shows a pseudonormal pattern of left ventricular diastolic filling.   3. There is normal right ventricular global systolic function.   4. Mild to moderate mitral valve regurgitation.   5. RVSP within normal limits.        Impression/Plan:   Severe mitral regurgitation  Asymptomatic    We discussed with him the fact that in view of the absence of symptoms and no markers of advanced disease that the general recommendation is surveillance.  If and when there is an indication for intervention we will be very happy to review him.    Thank you for involving us in his care  ____________________________________________________________  Jeb Fierro MD, DPhil, [facility]  Clinical Professor of Surgery  Division of Cardiac Surgery  Director, Cardiothoracic Transplant    [facility]  [facility]

## 2024-11-22 ENCOUNTER — OFFICE VISIT (OUTPATIENT)
Dept: CARDIAC SURGERY | Facility: HOSPITAL | Age: 74
End: 2024-11-22
Payer: COMMERCIAL

## 2024-11-22 ENCOUNTER — OFFICE VISIT (OUTPATIENT)
Dept: CARDIOLOGY | Facility: HOSPITAL | Age: 74
End: 2024-11-22
Payer: COMMERCIAL

## 2024-11-22 VITALS — OXYGEN SATURATION: 93 % | SYSTOLIC BLOOD PRESSURE: 112 MMHG | HEART RATE: 82 BPM | DIASTOLIC BLOOD PRESSURE: 77 MMHG

## 2024-11-22 DIAGNOSIS — I34.1 MITRAL VALVE PROLAPSE: ICD-10-CM

## 2024-11-22 DIAGNOSIS — R54 FRAILTY SYNDROME IN GERIATRIC PATIENT: ICD-10-CM

## 2024-11-22 DIAGNOSIS — I34.0 SEVERE MITRAL REGURGITATION: Primary | ICD-10-CM

## 2024-11-22 DIAGNOSIS — I34.0 NONRHEUMATIC MITRAL VALVE REGURGITATION: Primary | ICD-10-CM

## 2024-11-22 PROCEDURE — 1159F MED LIST DOCD IN RCRD: CPT | Performed by: THORACIC SURGERY (CARDIOTHORACIC VASCULAR SURGERY)

## 2024-11-22 PROCEDURE — 99213 OFFICE O/P EST LOW 20 MIN: CPT | Performed by: INTERNAL MEDICINE

## 2024-11-22 PROCEDURE — 3078F DIAST BP <80 MM HG: CPT | Performed by: THORACIC SURGERY (CARDIOTHORACIC VASCULAR SURGERY)

## 2024-11-22 PROCEDURE — 3074F SYST BP LT 130 MM HG: CPT | Performed by: THORACIC SURGERY (CARDIOTHORACIC VASCULAR SURGERY)

## 2024-11-22 PROCEDURE — 1125F AMNT PAIN NOTED PAIN PRSNT: CPT | Performed by: THORACIC SURGERY (CARDIOTHORACIC VASCULAR SURGERY)

## 2024-11-22 PROCEDURE — 99214 OFFICE O/P EST MOD 30 MIN: CPT | Performed by: THORACIC SURGERY (CARDIOTHORACIC VASCULAR SURGERY)

## 2024-11-22 PROCEDURE — 99204 OFFICE O/P NEW MOD 45 MIN: CPT | Performed by: THORACIC SURGERY (CARDIOTHORACIC VASCULAR SURGERY)

## 2024-11-22 PROCEDURE — 99203 OFFICE O/P NEW LOW 30 MIN: CPT | Performed by: INTERNAL MEDICINE

## 2024-11-22 RX ORDER — BUPROPION HYDROCHLORIDE 150 MG/1
150 TABLET ORAL DAILY
COMMUNITY
Start: 2023-04-11

## 2024-11-22 RX ORDER — NITROGLYCERIN 0.4 MG/1
TABLET SUBLINGUAL AS NEEDED
COMMUNITY
Start: 2024-11-19

## 2024-11-22 ASSESSMENT — PAIN SCALES - GENERAL: PAINLEVEL_OUTOF10: 7

## 2024-11-22 ASSESSMENT — ENCOUNTER SYMPTOMS
LOSS OF SENSATION IN FEET: 0
DEPRESSION: 0
OCCASIONAL FEELINGS OF UNSTEADINESS: 1

## 2024-11-22 ASSESSMENT — PATIENT HEALTH QUESTIONNAIRE - PHQ9
SUM OF ALL RESPONSES TO PHQ9 QUESTIONS 1 AND 2: 0
1. LITTLE INTEREST OR PLEASURE IN DOING THINGS: NOT AT ALL
2. FEELING DOWN, DEPRESSED OR HOPELESS: NOT AT ALL

## 2024-11-22 NOTE — PROGRESS NOTES
Referred by Dr. Willams     History Of Present Illness:    Wilian Murry is a 74 y.o. male presenting with severe mitral regurgitation  Has stated adamantly dose not want surgical repair.    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 He is   He lives in a nursing home now, ambulates with a walker or wheelchair, can only walk short distances. Denies dyspnea, chest pain, dizziness within this ambulatory limitations. No heart failure hospitalizations        NYHA I  Frailty mobility- 0, cognition - excellent, Alb 3.6, Hgb 11.8  STS PROM for isolated MVR and MVr  EKG  TTE 6/2024 - LVEF 60%, no significant aortic valve disease, anterior leaflet prolapse, eccentric MR jet difficult to quantify  AMADEO 7/25/2024 - LVEF 55%, severe mitral regurgitation secondary to anterior leaflet prolapse. Myxomatous leaflets. Mild TR. No significant aortic valve disease  Cath- pending  NM Stress test 6/2024 - No ischemia  CMR  KCCQ   Dental         PAST MEDICAL HISTORY  HTN  SSS s/p pacemaker  Parkinsons  Recurrent falls - last Jan 2024 - L. Rib fractures, small left hip effusion and compression deformities.. 9/2024 - right humeral and hip fractures  Perforated ulcer  s/p opean repair with Grahams patch 9/2023  Tobacco use  ? Substance abuse          Past Medical History:  He has a past medical history of Anxiety, Cervical disc disorder, Constipation, Depression, Diverticulitis, Hyperlipidemia, Hypertension, Muscle weakness, Nonrheumatic mitral (valve) prolapse, Pacemaker, Parkinson's disease (Multi), Perforation of intestine (nontraumatic), Peritoneal adhesion, Polyneuropathy, Psychoactive substance abuse (Multi), Sick sinus syndrome (Multi), Syncope, and TIA (transient ischemic attack).    Past Surgical History:  He has no past surgical history on file.      Social History:  He reports that he has quit smoking. His smoking use included  cigarettes. He started smoking about 50 years ago. He has a 25 pack-year smoking history. He has never used smokeless tobacco. He reports that he does not currently use alcohol. He reports that he does not use drugs.    Family History:  No family history on file.     Allergies:  Patient has no known allergies.    Outpatient Medications:  Current Outpatient Medications   Medication Instructions    acetaminophen (TYLENOL 8 HOUR) 650 mg, Every 6 hours PRN    acetaminophen (TYLENOL) 650 mg, Every 6 hours PRN    alum-mag hydroxide-simeth (Maalox MAX) 400-400-40 mg/5 mL suspension 30 mL, Every 12 hours PRN    atorvastatin (LIPITOR) 80 mg, Nightly    baclofen (Lioresal) 10 mg tablet 3 times daily    bisacodyl (DULCOLAX (BISACODYL)) 10 mg, Daily PRN    buPROPion XL (WELLBUTRIN XL) 150 mg, Daily    cholecalciferol (VITAMIN D-3) 25 mcg, Daily    diclofenac sodium (VOLTAREN) 4 g, 2 times daily PRN    docusate sodium (COLACE) 100 mg, Every 12 hours PRN    DULoxetine (CYMBALTA) 60 mg, Daily PRN    DULoxetine (CYMBALTA) 20 mg, Daily PRN    DULoxetine 40 mg, Daily PRN    fluticasone (Flonase) 50 mcg/actuation nasal spray 1 spray, Daily    furosemide (LASIX) 40 mg, Daily    gabapentin (NEURONTIN) 600 mg, Every 8 hours    guaiFENesin (ROBITUSSIN) 200 mg, Every 4 hours PRN    lidocaine (Lidoderm) 5 % patch 1 patch, Daily    lidocaine 4 % patch 1 patch, transdermal, Daily, Remove & discard patch within 12 hours or as directed by MD.    loperamide (IMODIUM A-D) 2 mg, Every 12 hours PRN    magnesium hydroxide (Milk of Magnesia) 400 mg/5 mL suspension 30 mL, Daily PRN    melatonin 3 mg, Nightly    methocarbamol (ROBAXIN) 750 mg, 3 times daily    metoprolol succinate XL (KAPSPARGO SPRINKLE) 100 mg, Daily    nitroglycerin (Nitrostat) 0.4 mg SL tablet As needed    oxyCODONE (ROXICODONE) 5 mg, Every 6 hours PRN    pantoprazole (PROTONIX) 40 mg, Daily before breakfast    psyllium (Metamucil) 0.4 gram capsule 5 capsules, Every 12 hours PRN     senna 8.6 mg tablet 1 tablet, Nightly    sucralfate (CARAFATE) 1 g, 4 times daily before meals and nightly    venlafaxine (EFFEXOR) 25 mg, Daily    venlafaxine (EFFEXOR) 37.5 mg, Daily        Last Recorded Vitals:  There were no vitals filed for this visit.    Physical Exam:  Physical Exam  HENT:      Head: Normocephalic.   Cardiovascular:      Rate and Rhythm: Normal rate.   Neurological:      Mental Status: He is alert.   Psychiatric:         Mood and Affect: Mood normal.             Last Labs:  CBC -  Lab Results   Component Value Date    WBC 10.9 10/22/2024    HGB 11.8 (L) 10/22/2024    HCT 37.1 (L) 10/22/2024    MCV 89 10/22/2024     10/22/2024       CMP -  Lab Results   Component Value Date    CALCIUM 9.0 10/22/2024    PROT 5.8 (L) 10/22/2024    ALBUMIN 3.6 10/22/2024    AST 14 10/22/2024    ALT 15 10/22/2024    ALKPHOS 90 10/22/2024    BILITOT 0.3 10/22/2024       RENAL FUNCTION PANEL -   Lab Results   Component Value Date    GLUCOSE 68 (L) 10/22/2024     10/22/2024    K 4.2 10/22/2024     10/22/2024    CO2 23 10/22/2024    ANIONGAP 13 10/22/2024    BUN 23 10/22/2024    CREATININE 0.95 10/22/2024    CALCIUM 9.0 10/22/2024    ALBUMIN 3.6 10/22/2024        Lab Results   Component Value Date    BNP 80 06/26/2024    HGBA1C 4.9 02/23/2022     ASSESSMENT AND PLAN  Severe primary mitral regurgitation  Recurrent falls  Frailty    Mr. Murry is a 74 year old man with asymptomatic severe primary mitral regurgitation at this time within the limits of his ambulatory dysfunction.  Given this, normal LVEF we discussed the options of mitral valve repair and surveillance.  He agreed to continued surveillance at this time. If he developes symptoms, heart failure, atrial fibrillation, progressive left ventricular dilation or decreasing LVEF then mitral TEDDY will be offered.    Thanks for the opportunity to participate in his care.      Lelo Tang MD